# Patient Record
Sex: MALE | Race: WHITE | Employment: OTHER | URBAN - METROPOLITAN AREA
[De-identification: names, ages, dates, MRNs, and addresses within clinical notes are randomized per-mention and may not be internally consistent; named-entity substitution may affect disease eponyms.]

---

## 2017-10-20 ENCOUNTER — HOSPITAL ENCOUNTER (OUTPATIENT)
Age: 67
Discharge: HOME | End: 2017-10-20
Payer: MEDICARE

## 2017-10-20 LAB
ABSOLUTE EOS #: 0.1 K/UL (ref 0–0.4)
ABSOLUTE IMMATURE GRANULOCYTE: NORMAL K/UL (ref 0–0.3)
ABSOLUTE LYMPH #: 1.3 K/UL (ref 1–4.8)
ABSOLUTE MONO #: 0.5 K/UL (ref 0.1–1.3)
ALBUMIN SERPL-MCNC: 4.4 G/DL (ref 3.5–5.2)
ALBUMIN/GLOBULIN RATIO: NORMAL (ref 1–2.5)
ALP BLD-CCNC: 77 U/L (ref 40–129)
ALT SERPL-CCNC: 21 U/L (ref 5–41)
ANION GAP SERPL CALCULATED.3IONS-SCNC: 11 MMOL/L (ref 9–17)
AST SERPL-CCNC: 18 U/L
BASOPHILS # BLD: 1 %
BASOPHILS ABSOLUTE: 0 K/UL (ref 0–0.2)
BILIRUB SERPL-MCNC: 0.62 MG/DL (ref 0.3–1.2)
BILIRUBIN DIRECT: 0.13 MG/DL
BILIRUBIN, INDIRECT: 0.49 MG/DL (ref 0–1)
BUN BLDV-MCNC: 15 MG/DL (ref 8–23)
CHLORIDE BLD-SCNC: 105 MMOL/L (ref 98–107)
CHOLESTEROL/HDL RATIO: 3.7
CHOLESTEROL: 206 MG/DL
CO2: 25 MMOL/L (ref 20–31)
CREAT SERPL-MCNC: 0.82 MG/DL (ref 0.7–1.2)
DIFFERENTIAL TYPE: NORMAL
EOSINOPHILS RELATIVE PERCENT: 3 %
GFR AFRICAN AMERICAN: >60 ML/MIN
GFR NON-AFRICAN AMERICAN: >60 ML/MIN
GFR SERPL CREATININE-BSD FRML MDRD: NORMAL ML/MIN/{1.73_M2}
GFR SERPL CREATININE-BSD FRML MDRD: NORMAL ML/MIN/{1.73_M2}
GLOBULIN: NORMAL G/DL (ref 1.5–3.8)
GLUCOSE BLD-MCNC: 108 MG/DL (ref 70–99)
HCT VFR BLD CALC: 48.4 % (ref 41–53)
HDLC SERPL-MCNC: 55 MG/DL
HEMOGLOBIN: 16.4 G/DL (ref 13.5–17.5)
IMMATURE GRANULOCYTES: NORMAL %
LDL CHOLESTEROL: 129 MG/DL (ref 0–130)
LYMPHOCYTES # BLD: 29 %
MCH RBC QN AUTO: 28.6 PG (ref 26–34)
MCHC RBC AUTO-ENTMCNC: 34 G/DL (ref 31–37)
MCV RBC AUTO: 84.1 FL (ref 80–100)
MONOCYTES # BLD: 12 %
PDW BLD-RTO: 14.2 % (ref 11.5–14.9)
PLATELET # BLD: 157 K/UL (ref 150–450)
PLATELET ESTIMATE: NORMAL
PMV BLD AUTO: 7.4 FL (ref 6–12)
POTASSIUM SERPL-SCNC: 4.4 MMOL/L (ref 3.7–5.3)
PROSTATE SPECIFIC ANTIGEN: 3.25 UG/L
RBC # BLD: 5.75 M/UL (ref 4.5–5.9)
RBC # BLD: NORMAL 10*6/UL
SEG NEUTROPHILS: 55 %
SEGMENTED NEUTROPHILS ABSOLUTE COUNT: 2.6 K/UL (ref 1.3–9.1)
SODIUM BLD-SCNC: 141 MMOL/L (ref 135–144)
THYROXINE, FREE: 1.08 NG/DL (ref 0.93–1.7)
TOTAL PROTEIN: 7.2 G/DL (ref 6.4–8.3)
TRIGL SERPL-MCNC: 112 MG/DL
TSH SERPL DL<=0.05 MIU/L-ACNC: 1.34 MIU/L (ref 0.3–5)
VITAMIN D 25-HYDROXY: 33 NG/ML (ref 30–100)
VLDLC SERPL CALC-MCNC: ABNORMAL MG/DL (ref 1–30)
WBC # BLD: 4.6 K/UL (ref 3.5–11)
WBC # BLD: NORMAL 10*3/UL

## 2017-10-20 PROCEDURE — 82306 VITAMIN D 25 HYDROXY: CPT

## 2017-10-20 PROCEDURE — 82565 ASSAY OF CREATININE: CPT

## 2017-10-20 PROCEDURE — 84153 ASSAY OF PSA TOTAL: CPT

## 2017-10-20 PROCEDURE — 85025 COMPLETE CBC W/AUTO DIFF WBC: CPT

## 2017-10-20 PROCEDURE — 80061 LIPID PANEL: CPT

## 2017-10-20 PROCEDURE — 80051 ELECTROLYTE PANEL: CPT

## 2017-10-20 PROCEDURE — 84443 ASSAY THYROID STIM HORMONE: CPT

## 2017-10-20 PROCEDURE — 82947 ASSAY GLUCOSE BLOOD QUANT: CPT

## 2017-10-20 PROCEDURE — 84520 ASSAY OF UREA NITROGEN: CPT

## 2017-10-20 PROCEDURE — 80076 HEPATIC FUNCTION PANEL: CPT

## 2017-10-20 PROCEDURE — 84439 ASSAY OF FREE THYROXINE: CPT

## 2017-10-20 PROCEDURE — 36415 COLL VENOUS BLD VENIPUNCTURE: CPT

## 2017-11-06 ENCOUNTER — OFFICE VISIT (OUTPATIENT)
Dept: ORTHOPEDIC SURGERY | Age: 67
End: 2017-11-06
Payer: MEDICARE

## 2017-11-06 DIAGNOSIS — M25.562 CHRONIC PAIN OF LEFT KNEE: Primary | ICD-10-CM

## 2017-11-06 DIAGNOSIS — G89.29 CHRONIC PAIN OF LEFT KNEE: Primary | ICD-10-CM

## 2017-11-06 PROCEDURE — 1123F ACP DISCUSS/DSCN MKR DOCD: CPT | Performed by: ORTHOPAEDIC SURGERY

## 2017-11-06 PROCEDURE — G8484 FLU IMMUNIZE NO ADMIN: HCPCS | Performed by: ORTHOPAEDIC SURGERY

## 2017-11-06 PROCEDURE — 3017F COLORECTAL CA SCREEN DOC REV: CPT | Performed by: ORTHOPAEDIC SURGERY

## 2017-11-06 PROCEDURE — 4040F PNEUMOC VAC/ADMIN/RCVD: CPT | Performed by: ORTHOPAEDIC SURGERY

## 2017-11-06 PROCEDURE — 4004F PT TOBACCO SCREEN RCVD TLK: CPT | Performed by: ORTHOPAEDIC SURGERY

## 2017-11-06 PROCEDURE — 99203 OFFICE O/P NEW LOW 30 MIN: CPT | Performed by: ORTHOPAEDIC SURGERY

## 2017-11-06 PROCEDURE — G8421 BMI NOT CALCULATED: HCPCS | Performed by: ORTHOPAEDIC SURGERY

## 2017-11-06 PROCEDURE — G8428 CUR MEDS NOT DOCUMENT: HCPCS | Performed by: ORTHOPAEDIC SURGERY

## 2017-11-06 ASSESSMENT — PROMIS GLOBAL HEALTH SCALE
SUM OF RESPONSES TO QUESTIONS 3, 6, 7, & 8: 14
WHO IS THE PERSON COMPLETING THE PROMIS V1.1 SURVEY?: 0
SUM OF RESPONSES TO QUESTIONS 2, 4, 5, & 10: 12
IN THE PAST 7 DAYS, HOW WOULD YOU RATE YOUR PAIN ON AVERAGE [ON A SCALE FROM 0 (NO PAIN) TO 10 (WORST IMAGINABLE PAIN)]?: 6
IN GENERAL, HOW WOULD YOU RATE YOUR SATISFACTION WITH YOUR SOCIAL ACTIVITIES AND RELATIONSHIPS [ON A SCALE OF 1 (POOR) TO 5 (EXCELLENT)]?: 3
HOW IS THE PROMIS V1.1 BEING ADMINISTERED?: 0
IN GENERAL, PLEASE RATE HOW WELL YOU CARRY OUT YOUR USUAL SOCIAL ACTIVITIES (INCLUDES ACTIVITIES AT HOME, AT WORK, AND IN YOUR COMMUNITY, AND RESPONSIBILITIES AS A PARENT, CHILD, SPOUSE, EMPLOYEE, FRIEND, ETC) [ON A SCALE OF 1 (POOR) TO 5 (EXCELLENT)]?: 3
IN THE PAST 7 DAYS, HOW OFTEN HAVE YOU BEEN BOTHERED BY EMOTIONAL PROBLEMS, SUCH AS FEELING ANXIOUS, DEPRESSED, OR IRRITABLE [ON A SCALE FROM 1 (NEVER) TO 5 (ALWAYS)]?: 3
IN GENERAL, HOW WOULD YOU RATE YOUR MENTAL HEALTH, INCLUDING YOUR MOOD AND YOUR ABILITY TO THINK [ON A SCALE OF 1 (POOR) TO 5 (EXCELLENT)]?: 3
IN GENERAL, WOULD YOU SAY YOUR QUALITY OF LIFE IS...[ON A SCALE OF 1 (POOR) TO 5 (EXCELLENT)]: 3
TO WHAT EXTENT ARE YOU ABLE TO CARRY OUT YOUR EVERYDAY PHYSICAL ACTIVITIES SUCH AS WALKING, CLIMBING STAIRS, CARRYING GROCERIES, OR MOVING A CHAIR [ON A SCALE OF 1 (NOT AT ALL) TO 5 (COMPLETELY)]?: 3
IN THE PAST 7 DAYS, HOW WOULD YOU RATE YOUR FATIGUE ON AVERAGE [ON A SCALE FROM 1 (NONE) TO 5 (VERY SEVERE)]?: 3
IN GENERAL, WOULD YOU SAY YOUR HEALTH IS...[ON A SCALE OF 1 (POOR) TO 5 (EXCELLENT)]: 3
IN GENERAL, HOW WOULD YOU RATE YOUR PHYSICAL HEALTH [ON A SCALE OF 1 (POOR) TO 5 (EXCELLENT)]?: 2

## 2017-11-06 ASSESSMENT — KOOS JR
STANDING UPRIGHT: 2
TWISING OR PIVOTING ON KNEE: 2
GOING UP OR DOWN STAIRS: 2
HOW SEVERE IS YOUR KNEE STIFFNESS AFTER FIRST WAKING IN MORNING: 2
BENDING TO THE FLOOR TO PICK UP OBJECT: 1
STRAIGHTENING KNEE FULLY: 2
RISING FROM SITTING: 1

## 2017-11-06 NOTE — PROGRESS NOTES
Stephanie Win M.D.            [address] [place] [address]., [address] [facility], [address] [facility]             Dept Phone: [phone]             Dept Fax:  [phone]  Melecio Chaidez is a 68-year-old patient seen for evaluation for left knee pain. Patient denies any previous injury or trauma to his knee. He did have lower extremity trauma to his tibia years and years ago he is retired from [employer] instead on concrete floors many many years. States his left knee is gotten the point where he can do his daily activities. Examination of his left knee notes his motion is surprisingly good given how bad his knee is radiographically. His motion is 2-130 degrees. He has good stability lots of crepitus throughout. Very mild varus thrust.'s moderate patellofemoral crepitus as well minimal effusion no pain or rotation of his hips. Physical examination was limited to his knees. He states that he has had some ankle problems in the past.    X-rays taken today reviewed by me show standing views AP view of both knees. He also had outside films AP lateral.  Patient has essentially bone-on-bone disease and of the tricompartmentally left knee. His right knee remarkably is perfectly normal.    Impression  Posttraumatic degenerative joint disease left knee    Plan  Patient wishes to have total knee arthroplasty. I does impression that his his distal tibial surgery have affects his overall alignment and did not get does has full length films. I think that he will benefit from having a total knee arthroplasty. Patient only takes a baby aspirin. No personal or family history for DVT. We did discuss the typical preoperative postoperative benign course as well as risks and benefits. We'll see him back here postoperatively. Review of Systems   Constitutional: Negative. HENT: Negative. Respiratory: Negative. Cardiovascular: Negative.     Musculoskeletal:

## 2017-11-10 ENCOUNTER — HOSPITAL ENCOUNTER (OUTPATIENT)
Dept: PREADMISSION TESTING | Age: 67
Discharge: HOME | End: 2017-11-10
Payer: MEDICARE

## 2017-11-10 VITALS
WEIGHT: 170 LBS | TEMPERATURE: 97.8 F | DIASTOLIC BLOOD PRESSURE: 79 MMHG | SYSTOLIC BLOOD PRESSURE: 135 MMHG | HEART RATE: 57 BPM | OXYGEN SATURATION: 95 % | RESPIRATION RATE: 18 BRPM | HEIGHT: 69 IN | BODY MASS INDEX: 25.18 KG/M2

## 2017-11-10 LAB
ABSOLUTE EOS #: 0.1 K/UL (ref 0–0.4)
ABSOLUTE IMMATURE GRANULOCYTE: ABNORMAL K/UL (ref 0–0.3)
ABSOLUTE LYMPH #: 1.5 K/UL (ref 1–4.8)
ABSOLUTE MONO #: 0.7 K/UL (ref 0.1–1.3)
ANION GAP SERPL CALCULATED.3IONS-SCNC: 10 MMOL/L (ref 9–17)
BASOPHILS # BLD: 1 %
BASOPHILS ABSOLUTE: 0.1 K/UL (ref 0–0.2)
BILIRUBIN URINE: NEGATIVE
BUN BLDV-MCNC: 16 MG/DL (ref 8–23)
BUN/CREAT BLD: ABNORMAL (ref 9–20)
CALCIUM SERPL-MCNC: 9.9 MG/DL (ref 8.6–10.4)
CHLORIDE BLD-SCNC: 104 MMOL/L (ref 98–107)
CO2: 28 MMOL/L (ref 20–31)
COLOR: YELLOW
COMMENT UA: NORMAL
CREAT SERPL-MCNC: 0.84 MG/DL (ref 0.7–1.2)
DIFFERENTIAL TYPE: ABNORMAL
EKG ATRIAL RATE: 52 BPM
EKG P AXIS: 53 DEGREES
EKG P-R INTERVAL: 212 MS
EKG Q-T INTERVAL: 408 MS
EKG QRS DURATION: 86 MS
EKG QTC CALCULATION (BAZETT): 379 MS
EKG R AXIS: 23 DEGREES
EKG T AXIS: 60 DEGREES
EKG VENTRICULAR RATE: 52 BPM
EOSINOPHILS RELATIVE PERCENT: 2 %
GFR AFRICAN AMERICAN: >60 ML/MIN
GFR NON-AFRICAN AMERICAN: >60 ML/MIN
GFR SERPL CREATININE-BSD FRML MDRD: ABNORMAL ML/MIN/{1.73_M2}
GFR SERPL CREATININE-BSD FRML MDRD: ABNORMAL ML/MIN/{1.73_M2}
GLUCOSE BLD-MCNC: 113 MG/DL (ref 70–99)
GLUCOSE URINE: NEGATIVE
HCT VFR BLD CALC: 50.4 % (ref 41–53)
HEMOGLOBIN: 16.8 G/DL (ref 13.5–17.5)
IMMATURE GRANULOCYTES: ABNORMAL %
KETONES, URINE: NEGATIVE
LEUKOCYTE ESTERASE, URINE: NEGATIVE
LYMPHOCYTES # BLD: 29 %
MCH RBC QN AUTO: 28.3 PG (ref 26–34)
MCHC RBC AUTO-ENTMCNC: 33.4 G/DL (ref 31–37)
MCV RBC AUTO: 84.8 FL (ref 80–100)
MONOCYTES # BLD: 13 %
NITRITE, URINE: NEGATIVE
PDW BLD-RTO: 13.8 % (ref 11.5–14.9)
PH UA: 6.5 (ref 5–8)
PLATELET # BLD: 177 K/UL (ref 150–450)
PLATELET ESTIMATE: ABNORMAL
PMV BLD AUTO: 8.1 FL (ref 6–12)
POTASSIUM SERPL-SCNC: 4.7 MMOL/L (ref 3.7–5.3)
PROTEIN UA: NEGATIVE
RBC # BLD: 5.95 M/UL (ref 4.5–5.9)
RBC # BLD: ABNORMAL 10*6/UL
SEG NEUTROPHILS: 55 %
SEGMENTED NEUTROPHILS ABSOLUTE COUNT: 2.9 K/UL (ref 1.3–9.1)
SODIUM BLD-SCNC: 142 MMOL/L (ref 135–144)
SPECIFIC GRAVITY UA: 1.01 (ref 1–1.03)
TURBIDITY: CLEAR
URINE HGB: NEGATIVE
UROBILINOGEN, URINE: NORMAL
WBC # BLD: 5.3 K/UL (ref 3.5–11)
WBC # BLD: ABNORMAL 10*3/UL

## 2017-11-10 PROCEDURE — 93005 ELECTROCARDIOGRAM TRACING: CPT

## 2017-11-10 PROCEDURE — 36415 COLL VENOUS BLD VENIPUNCTURE: CPT

## 2017-11-10 PROCEDURE — 80048 BASIC METABOLIC PNL TOTAL CA: CPT

## 2017-11-10 PROCEDURE — 81003 URINALYSIS AUTO W/O SCOPE: CPT

## 2017-11-10 PROCEDURE — 87641 MR-STAPH DNA AMP PROBE: CPT

## 2017-11-10 PROCEDURE — 85025 COMPLETE CBC W/AUTO DIFF WBC: CPT

## 2017-11-10 NOTE — H&P
HISTORY and [address]       NAME:  Jerrell Bledsoe  MRN: [id number]   YOB: 1950   Date: 11/10/2017   Age: 75 y.o. Gender: male       COMPLAINT AND PRESENT HISTORY:     Jerrell Bledsoe is 75 y.o.,   male, undergoing preadmission testing for left Knee O. A. Patient C/O of pain swelling, stiffness, popping and cracking in the left Knee. Pt describes the pain as 7/10 in intensity at times. Symptoms started 1 month ago. The knee does occ buckle or give way under the patient. No locking up, No recent falls or trauma. No redness, swelling or rashes. No Hx of MRSA infections in the past.    PAST MEDICAL HISTORY     Past Medical History:   Diagnosis Date    Acid indigestion     a little    BPH (benign prostatic hyperplasia)     COPD (chronic obstructive pulmonary disease) (HCC)     Depression     Full dentures     Hyperlipidemia     Hypertension     Wears glasses        Pt denies any history of Diabetes mellitus type 2, stroke, heart disease, COPD, Asthma, Cancer, Seizures,Thyroid disease, Hepatitis, TB or Substance abuse. SURGICAL HISTORY       Past Surgical History:   Procedure Laterality Date    ABDOMINAL EXPLORATION SURGERY Right 1968    'kidney nicked by gilmer\" 2 PIECES REMAIN TO RT. KIDNEY.     CATARACT REMOVAL Bilateral     iol's    CHOLECYSTECTOMY, LAPAROSCOPIC  9/8/15    COLONOSCOPY      FOOT SURGERY Left     FRACTURE SURGERY      left leg 1970, no hardware    TONSILLECTOMY         FAMILY HISTORY       Family History   Problem Relation Age of Onset    Cancer Mother      throat    COPD Father     Other Father      mesothelioma       SOCIAL HISTORY       Social History     Social History    Marital status:      Spouse name: N/A    Number of children: N/A    Years of education: N/A     Social History Main Topics    Smoking status: Former Smoker    Smokeless tobacco: Never Used      Comment: QUIT SMOKING 2009    Alcohol use No

## 2017-11-11 ENCOUNTER — ANESTHESIA EVENT (OUTPATIENT)
Dept: OPERATING ROOM | Age: 67
DRG: 470 | End: 2017-11-11
Payer: MEDICARE

## 2017-11-11 LAB
MRSA, DNA, NASAL: NORMAL
SPECIMEN DESCRIPTION: NORMAL

## 2017-11-11 RX ORDER — SODIUM CHLORIDE 0.9 % (FLUSH) 0.9 %
10 SYRINGE (ML) INJECTION PRN
Status: CANCELLED | OUTPATIENT
Start: 2017-11-11

## 2017-11-11 RX ORDER — SODIUM CHLORIDE, SODIUM LACTATE, POTASSIUM CHLORIDE, CALCIUM CHLORIDE 600; 310; 30; 20 MG/100ML; MG/100ML; MG/100ML; MG/100ML
INJECTION, SOLUTION INTRAVENOUS CONTINUOUS
Status: CANCELLED | OUTPATIENT
Start: 2017-11-11

## 2017-11-11 RX ORDER — LIDOCAINE HYDROCHLORIDE 10 MG/ML
1 INJECTION, SOLUTION EPIDURAL; INFILTRATION; INTRACAUDAL; PERINEURAL
Status: CANCELLED | OUTPATIENT
Start: 2017-11-11 | End: 2017-11-11

## 2017-11-11 RX ORDER — SODIUM CHLORIDE 0.9 % (FLUSH) 0.9 %
10 SYRINGE (ML) INJECTION EVERY 12 HOURS SCHEDULED
Status: CANCELLED | OUTPATIENT
Start: 2017-11-11

## 2017-11-13 LAB
DIRECT EXAM: NORMAL
Lab: NORMAL
SPECIMEN DESCRIPTION: NORMAL
STATUS: NORMAL

## 2017-11-21 ENCOUNTER — ANESTHESIA (OUTPATIENT)
Dept: OPERATING ROOM | Age: 67
DRG: 470 | End: 2017-11-21
Payer: MEDICARE

## 2017-11-21 ENCOUNTER — APPOINTMENT (OUTPATIENT)
Dept: GENERAL RADIOLOGY | Age: 67
DRG: 470 | End: 2017-11-21
Attending: ORTHOPAEDIC SURGERY
Payer: MEDICARE

## 2017-11-21 ENCOUNTER — HOSPITAL ENCOUNTER (INPATIENT)
Age: 67
LOS: 1 days | Discharge: HOME HEALTH | DRG: 470 | End: 2017-11-22
Attending: ORTHOPAEDIC SURGERY | Admitting: ORTHOPAEDIC SURGERY
Payer: MEDICARE

## 2017-11-21 VITALS — DIASTOLIC BLOOD PRESSURE: 55 MMHG | TEMPERATURE: 94.8 F | SYSTOLIC BLOOD PRESSURE: 104 MMHG | OXYGEN SATURATION: 92 %

## 2017-11-21 PROBLEM — M17.12 PRIMARY OSTEOARTHRITIS OF LEFT KNEE: Status: ACTIVE | Noted: 2017-11-21

## 2017-11-21 PROCEDURE — 64448 NJX AA&/STRD FEM NRV NFS IMG: CPT | Performed by: ANESTHESIOLOGY

## 2017-11-21 PROCEDURE — 3700000001 HC ADD 15 MINUTES (ANESTHESIA): Performed by: ORTHOPAEDIC SURGERY

## 2017-11-21 PROCEDURE — 0SRD0J9 REPLACEMENT OF LEFT KNEE JOINT WITH SYNTHETIC SUBSTITUTE, CEMENTED, OPEN APPROACH: ICD-10-PCS | Performed by: ORTHOPAEDIC SURGERY

## 2017-11-21 PROCEDURE — 6360000002 HC RX W HCPCS: Performed by: ORTHOPAEDIC SURGERY

## 2017-11-21 PROCEDURE — 3600000015 HC SURGERY LEVEL 5 ADDTL 15MIN: Performed by: ORTHOPAEDIC SURGERY

## 2017-11-21 PROCEDURE — 6370000000 HC RX 637 (ALT 250 FOR IP): Performed by: FAMILY MEDICINE

## 2017-11-21 PROCEDURE — C1713 ANCHOR/SCREW BN/BN,TIS/BN: HCPCS | Performed by: ORTHOPAEDIC SURGERY

## 2017-11-21 PROCEDURE — A6197 ALGINATE DRSG >16 <=48 SQ IN: HCPCS | Performed by: ORTHOPAEDIC SURGERY

## 2017-11-21 PROCEDURE — 73560 X-RAY EXAM OF KNEE 1 OR 2: CPT

## 2017-11-21 PROCEDURE — 2720000010 HC SURG SUPPLY STERILE: Performed by: ORTHOPAEDIC SURGERY

## 2017-11-21 PROCEDURE — 2580000003 HC RX 258: Performed by: ANESTHESIOLOGY

## 2017-11-21 PROCEDURE — 2580000003 HC RX 258: Performed by: ORTHOPAEDIC SURGERY

## 2017-11-21 PROCEDURE — C1776 JOINT DEVICE (IMPLANTABLE): HCPCS | Performed by: ORTHOPAEDIC SURGERY

## 2017-11-21 PROCEDURE — 7100000000 HC PACU RECOVERY - FIRST 15 MIN: Performed by: ORTHOPAEDIC SURGERY

## 2017-11-21 PROCEDURE — 2500000003 HC RX 250 WO HCPCS: Performed by: NURSE ANESTHETIST, CERTIFIED REGISTERED

## 2017-11-21 PROCEDURE — 3700000000 HC ANESTHESIA ATTENDED CARE: Performed by: ORTHOPAEDIC SURGERY

## 2017-11-21 PROCEDURE — 6360000002 HC RX W HCPCS: Performed by: NURSE ANESTHETIST, CERTIFIED REGISTERED

## 2017-11-21 PROCEDURE — 6360000002 HC RX W HCPCS: Performed by: ANESTHESIOLOGY

## 2017-11-21 PROCEDURE — 2500000003 HC RX 250 WO HCPCS: Performed by: ORTHOPAEDIC SURGERY

## 2017-11-21 PROCEDURE — 2500000003 HC RX 250 WO HCPCS: Performed by: ANESTHESIOLOGY

## 2017-11-21 PROCEDURE — 1200000000 HC SEMI PRIVATE

## 2017-11-21 PROCEDURE — 3600000005 HC SURGERY LEVEL 5 BASE: Performed by: ORTHOPAEDIC SURGERY

## 2017-11-21 PROCEDURE — 3E0T3BZ INTRODUCTION OF ANESTHETIC AGENT INTO PERIPHERAL NERVES AND PLEXI, PERCUTANEOUS APPROACH: ICD-10-PCS | Performed by: ANESTHESIOLOGY

## 2017-11-21 PROCEDURE — 2780000010 HC IMPLANT OTHER: Performed by: ORTHOPAEDIC SURGERY

## 2017-11-21 PROCEDURE — 7100000001 HC PACU RECOVERY - ADDTL 15 MIN: Performed by: ORTHOPAEDIC SURGERY

## 2017-11-21 PROCEDURE — 6370000000 HC RX 637 (ALT 250 FOR IP): Performed by: ORTHOPAEDIC SURGERY

## 2017-11-21 DEVICE — DISCONTINUED USE 416978 CEMENT PALACOS R SING DOSE 40GR: Type: IMPLANTABLE DEVICE | Site: KNEE | Status: FUNCTIONAL

## 2017-11-21 DEVICE — COMPONENT FEM 67.5MM L KNEE CO CHROM NP INTLOK PRI CRUCE: Type: IMPLANTABLE DEVICE | Site: KNEE | Status: FUNCTIONAL

## 2017-11-21 DEVICE — TRAY TIB L79MM KNEE CO CHROM FIN MOD INTLOK VANGUARD: Type: IMPLANTABLE DEVICE | Site: KNEE | Status: FUNCTIONAL

## 2017-11-21 DEVICE — COMPONENT PAT DIA37MM THK8.6MM THN KNEE POLY 3 PEG SER A: Type: IMPLANTABLE DEVICE | Site: KNEE | Status: FUNCTIONAL

## 2017-11-21 DEVICE — IMPLANTABLE DEVICE: Type: IMPLANTABLE DEVICE | Site: KNEE | Status: FUNCTIONAL

## 2017-11-21 RX ORDER — MORPHINE SULFATE 10 MG/ML
2 INJECTION, SOLUTION INTRAMUSCULAR; INTRAVENOUS EVERY 5 MIN PRN
Status: DISCONTINUED | OUTPATIENT
Start: 2017-11-21 | End: 2017-11-21 | Stop reason: HOSPADM

## 2017-11-21 RX ORDER — ATORVASTATIN CALCIUM 20 MG/1
20 TABLET, FILM COATED ORAL NIGHTLY
Status: DISCONTINUED | OUTPATIENT
Start: 2017-11-21 | End: 2017-11-22 | Stop reason: HOSPADM

## 2017-11-21 RX ORDER — DIPHENHYDRAMINE HYDROCHLORIDE 50 MG/ML
12.5 INJECTION INTRAMUSCULAR; INTRAVENOUS
Status: DISCONTINUED | OUTPATIENT
Start: 2017-11-21 | End: 2017-11-21 | Stop reason: HOSPADM

## 2017-11-21 RX ORDER — SODIUM CHLORIDE, SODIUM LACTATE, POTASSIUM CHLORIDE, CALCIUM CHLORIDE 600; 310; 30; 20 MG/100ML; MG/100ML; MG/100ML; MG/100ML
INJECTION, SOLUTION INTRAVENOUS CONTINUOUS
Status: DISCONTINUED | OUTPATIENT
Start: 2017-11-21 | End: 2017-11-22 | Stop reason: HOSPADM

## 2017-11-21 RX ORDER — MEPERIDINE HYDROCHLORIDE 50 MG/ML
12.5 INJECTION INTRAMUSCULAR; INTRAVENOUS; SUBCUTANEOUS EVERY 5 MIN PRN
Status: DISCONTINUED | OUTPATIENT
Start: 2017-11-21 | End: 2017-11-21 | Stop reason: HOSPADM

## 2017-11-21 RX ORDER — LABETALOL HYDROCHLORIDE 5 MG/ML
5 INJECTION, SOLUTION INTRAVENOUS EVERY 10 MIN PRN
Status: DISCONTINUED | OUTPATIENT
Start: 2017-11-21 | End: 2017-11-21 | Stop reason: HOSPADM

## 2017-11-21 RX ORDER — OXYCODONE HYDROCHLORIDE 5 MG/1
10 TABLET ORAL EVERY 4 HOURS PRN
Status: DISCONTINUED | OUTPATIENT
Start: 2017-11-21 | End: 2017-11-22 | Stop reason: HOSPADM

## 2017-11-21 RX ORDER — PROPOFOL 10 MG/ML
INJECTION, EMULSION INTRAVENOUS CONTINUOUS PRN
Status: DISCONTINUED | OUTPATIENT
Start: 2017-11-21 | End: 2017-11-21 | Stop reason: SDUPTHER

## 2017-11-21 RX ORDER — LIDOCAINE HYDROCHLORIDE 10 MG/ML
1 INJECTION, SOLUTION EPIDURAL; INFILTRATION; INTRACAUDAL; PERINEURAL
Status: DISCONTINUED | OUTPATIENT
Start: 2017-11-21 | End: 2017-11-21 | Stop reason: HOSPADM

## 2017-11-21 RX ORDER — SODIUM CHLORIDE 0.9 % (FLUSH) 0.9 %
10 SYRINGE (ML) INJECTION PRN
Status: DISCONTINUED | OUTPATIENT
Start: 2017-11-21 | End: 2017-11-22 | Stop reason: HOSPADM

## 2017-11-21 RX ORDER — ONDANSETRON 2 MG/ML
4 INJECTION INTRAMUSCULAR; INTRAVENOUS
Status: DISCONTINUED | OUTPATIENT
Start: 2017-11-21 | End: 2017-11-21 | Stop reason: HOSPADM

## 2017-11-21 RX ORDER — HYDROMORPHONE HCL 110MG/55ML
0.5 PATIENT CONTROLLED ANALGESIA SYRINGE INTRAVENOUS EVERY 5 MIN PRN
Status: DISCONTINUED | OUTPATIENT
Start: 2017-11-21 | End: 2017-11-21 | Stop reason: HOSPADM

## 2017-11-21 RX ORDER — TRANEXAMIC ACID 100 MG/ML
INJECTION, SOLUTION INTRAVENOUS PRN
Status: DISCONTINUED | OUTPATIENT
Start: 2017-11-21 | End: 2017-11-21 | Stop reason: SDUPTHER

## 2017-11-21 RX ORDER — SODIUM CHLORIDE 0.9 % (FLUSH) 0.9 %
10 SYRINGE (ML) INJECTION PRN
Status: DISCONTINUED | OUTPATIENT
Start: 2017-11-21 | End: 2017-11-21 | Stop reason: HOSPADM

## 2017-11-21 RX ORDER — BUPROPION HYDROCHLORIDE 300 MG/1
300 TABLET ORAL EVERY MORNING
Status: DISCONTINUED | OUTPATIENT
Start: 2017-11-22 | End: 2017-11-22 | Stop reason: HOSPADM

## 2017-11-21 RX ORDER — MIDAZOLAM HYDROCHLORIDE 1 MG/ML
INJECTION INTRAMUSCULAR; INTRAVENOUS PRN
Status: DISCONTINUED | OUTPATIENT
Start: 2017-11-21 | End: 2017-11-21 | Stop reason: SDUPTHER

## 2017-11-21 RX ORDER — KETOROLAC TROMETHAMINE 30 MG/ML
15 INJECTION, SOLUTION INTRAMUSCULAR; INTRAVENOUS EVERY 8 HOURS SCHEDULED
Status: DISCONTINUED | OUTPATIENT
Start: 2017-11-21 | End: 2017-11-22 | Stop reason: HOSPADM

## 2017-11-21 RX ORDER — OXYCODONE HYDROCHLORIDE 5 MG/1
5 TABLET ORAL EVERY 4 HOURS PRN
Status: DISCONTINUED | OUTPATIENT
Start: 2017-11-21 | End: 2017-11-22 | Stop reason: HOSPADM

## 2017-11-21 RX ORDER — SODIUM CHLORIDE 0.9 % (FLUSH) 0.9 %
10 SYRINGE (ML) INJECTION EVERY 12 HOURS SCHEDULED
Status: DISCONTINUED | OUTPATIENT
Start: 2017-11-21 | End: 2017-11-21 | Stop reason: HOSPADM

## 2017-11-21 RX ORDER — SODIUM CHLORIDE, SODIUM LACTATE, POTASSIUM CHLORIDE, CALCIUM CHLORIDE 600; 310; 30; 20 MG/100ML; MG/100ML; MG/100ML; MG/100ML
INJECTION, SOLUTION INTRAVENOUS CONTINUOUS
Status: DISCONTINUED | OUTPATIENT
Start: 2017-11-21 | End: 2017-11-22

## 2017-11-21 RX ORDER — PANTOPRAZOLE SODIUM 40 MG/1
40 TABLET, DELAYED RELEASE ORAL
Status: DISCONTINUED | OUTPATIENT
Start: 2017-11-22 | End: 2017-11-22 | Stop reason: HOSPADM

## 2017-11-21 RX ORDER — BACITRACIN 50000 [USP'U]/1
INJECTION, POWDER, LYOPHILIZED, FOR SOLUTION INTRAMUSCULAR PRN
Status: DISCONTINUED | OUTPATIENT
Start: 2017-11-21 | End: 2017-11-21 | Stop reason: HOSPADM

## 2017-11-21 RX ORDER — FENTANYL CITRATE 50 UG/ML
INJECTION, SOLUTION INTRAMUSCULAR; INTRAVENOUS PRN
Status: DISCONTINUED | OUTPATIENT
Start: 2017-11-21 | End: 2017-11-21 | Stop reason: SDUPTHER

## 2017-11-21 RX ORDER — LIDOCAINE HYDROCHLORIDE 10 MG/ML
INJECTION, SOLUTION EPIDURAL; INFILTRATION; INTRACAUDAL; PERINEURAL PRN
Status: DISCONTINUED | OUTPATIENT
Start: 2017-11-21 | End: 2017-11-21 | Stop reason: SDUPTHER

## 2017-11-21 RX ORDER — ONDANSETRON 2 MG/ML
4 INJECTION INTRAMUSCULAR; INTRAVENOUS EVERY 6 HOURS PRN
Status: DISCONTINUED | OUTPATIENT
Start: 2017-11-21 | End: 2017-11-22 | Stop reason: HOSPADM

## 2017-11-21 RX ORDER — ACETAMINOPHEN 500 MG
1000 TABLET ORAL EVERY 8 HOURS SCHEDULED
Status: DISCONTINUED | OUTPATIENT
Start: 2017-11-21 | End: 2017-11-22 | Stop reason: HOSPADM

## 2017-11-21 RX ORDER — DOCUSATE SODIUM 100 MG/1
100 CAPSULE, LIQUID FILLED ORAL 2 TIMES DAILY
Status: DISCONTINUED | OUTPATIENT
Start: 2017-11-21 | End: 2017-11-22 | Stop reason: HOSPADM

## 2017-11-21 RX ORDER — SODIUM CHLORIDE 0.9 % (FLUSH) 0.9 %
10 SYRINGE (ML) INJECTION EVERY 12 HOURS SCHEDULED
Status: DISCONTINUED | OUTPATIENT
Start: 2017-11-21 | End: 2017-11-22 | Stop reason: HOSPADM

## 2017-11-21 RX ORDER — TAMSULOSIN HYDROCHLORIDE 0.4 MG/1
0.4 CAPSULE ORAL NIGHTLY
Status: DISCONTINUED | OUTPATIENT
Start: 2017-11-21 | End: 2017-11-22 | Stop reason: HOSPADM

## 2017-11-21 RX ADMIN — ACETAMINOPHEN 1000 MG: 500 TABLET ORAL at 17:09

## 2017-11-21 RX ADMIN — MEPERIDINE HYDROCHLORIDE 12.5 MG: 50 INJECTION, SOLUTION INTRAMUSCULAR; INTRAVENOUS; SUBCUTANEOUS at 15:20

## 2017-11-21 RX ADMIN — CEFAZOLIN 2 G: 1 INJECTION, POWDER, FOR SOLUTION INTRAMUSCULAR; INTRAVENOUS at 13:07

## 2017-11-21 RX ADMIN — DOCUSATE SODIUM 100 MG: 100 CAPSULE, LIQUID FILLED ORAL at 21:12

## 2017-11-21 RX ADMIN — SODIUM CHLORIDE, POTASSIUM CHLORIDE, SODIUM LACTATE AND CALCIUM CHLORIDE: 600; 310; 30; 20 INJECTION, SOLUTION INTRAVENOUS at 13:30

## 2017-11-21 RX ADMIN — SODIUM CHLORIDE, POTASSIUM CHLORIDE, SODIUM LACTATE AND CALCIUM CHLORIDE: 600; 310; 30; 20 INJECTION, SOLUTION INTRAVENOUS at 17:05

## 2017-11-21 RX ADMIN — TRANEXAMIC ACID 1000 MG: 100 INJECTION, SOLUTION INTRAVENOUS at 14:18

## 2017-11-21 RX ADMIN — KETOROLAC TROMETHAMINE 15 MG: 30 INJECTION, SOLUTION INTRAMUSCULAR at 21:12

## 2017-11-21 RX ADMIN — PROPOFOL 100 MCG/KG/MIN: 10 INJECTION, EMULSION INTRAVENOUS at 13:18

## 2017-11-21 RX ADMIN — FENTANYL CITRATE 50 MCG: 50 INJECTION, SOLUTION INTRAMUSCULAR; INTRAVENOUS at 13:11

## 2017-11-21 RX ADMIN — OXYCODONE HYDROCHLORIDE 5 MG: 5 TABLET ORAL at 22:52

## 2017-11-21 RX ADMIN — Medication 750 ML: at 14:59

## 2017-11-21 RX ADMIN — WATER 2 G: 1 INJECTION INTRAMUSCULAR; INTRAVENOUS; SUBCUTANEOUS at 22:34

## 2017-11-21 RX ADMIN — SODIUM CHLORIDE, POTASSIUM CHLORIDE, SODIUM LACTATE AND CALCIUM CHLORIDE: 600; 310; 30; 20 INJECTION, SOLUTION INTRAVENOUS at 22:52

## 2017-11-21 RX ADMIN — SODIUM CHLORIDE, POTASSIUM CHLORIDE, SODIUM LACTATE AND CALCIUM CHLORIDE: 600; 310; 30; 20 INJECTION, SOLUTION INTRAVENOUS at 13:07

## 2017-11-21 RX ADMIN — ASPIRIN 325 MG: 325 TABLET, DELAYED RELEASE ORAL at 21:12

## 2017-11-21 RX ADMIN — MIDAZOLAM 2 MG: 1 INJECTION INTRAMUSCULAR; INTRAVENOUS at 13:07

## 2017-11-21 RX ADMIN — LIDOCAINE HYDROCHLORIDE 50 MG: 10 INJECTION, SOLUTION EPIDURAL; INFILTRATION; INTRACAUDAL; PERINEURAL at 13:18

## 2017-11-21 RX ADMIN — TRANEXAMIC ACID 1000 MG: 100 INJECTION, SOLUTION INTRAVENOUS at 13:22

## 2017-11-21 RX ADMIN — FENTANYL CITRATE 25 MCG: 50 INJECTION, SOLUTION INTRAMUSCULAR; INTRAVENOUS at 13:50

## 2017-11-21 RX ADMIN — FENTANYL CITRATE 25 MCG: 50 INJECTION, SOLUTION INTRAMUSCULAR; INTRAVENOUS at 14:28

## 2017-11-21 RX ADMIN — ATORVASTATIN CALCIUM 20 MG: 20 TABLET, FILM COATED ORAL at 21:12

## 2017-11-21 RX ADMIN — KETOROLAC TROMETHAMINE 15 MG: 30 INJECTION, SOLUTION INTRAMUSCULAR at 17:10

## 2017-11-21 RX ADMIN — ACETAMINOPHEN 1000 MG: 500 TABLET ORAL at 21:12

## 2017-11-21 RX ADMIN — SODIUM CHLORIDE, POTASSIUM CHLORIDE, SODIUM LACTATE AND CALCIUM CHLORIDE: 600; 310; 30; 20 INJECTION, SOLUTION INTRAVENOUS at 11:49

## 2017-11-21 RX ADMIN — TAMSULOSIN HYDROCHLORIDE 0.4 MG: 0.4 CAPSULE ORAL at 21:12

## 2017-11-21 ASSESSMENT — ENCOUNTER SYMPTOMS
STRIDOR: 0
SHORTNESS OF BREATH: 0

## 2017-11-21 ASSESSMENT — PAIN SCALES - GENERAL
PAINLEVEL_OUTOF10: 5
PAINLEVEL_OUTOF10: 0
PAINLEVEL_OUTOF10: 6
PAINLEVEL_OUTOF10: 0
PAINLEVEL_OUTOF10: 6

## 2017-11-21 ASSESSMENT — PAIN DESCRIPTION - LOCATION
LOCATION: KNEE
LOCATION: KNEE

## 2017-11-21 ASSESSMENT — PAIN DESCRIPTION - ORIENTATION
ORIENTATION: LEFT
ORIENTATION: LEFT

## 2017-11-21 ASSESSMENT — PAIN DESCRIPTION - PAIN TYPE
TYPE: SURGICAL PAIN
TYPE: SURGICAL PAIN

## 2017-11-21 ASSESSMENT — COPD QUESTIONNAIRES: CAT_SEVERITY: NO INTERVAL CHANGE

## 2017-11-21 ASSESSMENT — PAIN - FUNCTIONAL ASSESSMENT: PAIN_FUNCTIONAL_ASSESSMENT: 0-10

## 2017-11-21 ASSESSMENT — LIFESTYLE VARIABLES: SMOKING_STATUS: 0

## 2017-11-21 NOTE — H&P
stroke, heart disease, COPD, Asthma, Cancer, Seizures,Thyroid disease, Hepatitis, TB or Substance abuse.     SURGICAL HISTORY        Past Surgical History         Past Surgical History:   Procedure Laterality Date    ABDOMINAL EXPLORATION SURGERY Right 1968     'kidney nicked by schrapnel\" 2 PIECES REMAIN TO RT. KIDNEY.  CATARACT REMOVAL Bilateral       iol's    CHOLECYSTECTOMY, LAPAROSCOPIC   9/8/15    COLONOSCOPY        FOOT SURGERY Left      FRACTURE SURGERY         left leg 1970, no hardware    TONSILLECTOMY                FAMILY HISTORY        Family History          Family History   Problem Relation Age of Onset    Cancer Mother         throat    COPD Father      Other Father         mesothelioma            SOCIAL HISTORY        Social History   Social History            Social History    Marital status:        Spouse name: N/A    Number of children: N/A    Years of education: N/A             Social History Main Topics    Smoking status: Former Smoker    Smokeless tobacco: Never Used         Comment: QUIT SMOKING 2009    Alcohol use No         Comment: quit 1982    Drug use: No    Sexual activity: Not Asked           Other Topics Concern    None          Social History Narrative    None               REVIEW OF SYSTEMS             Allergies   Allergen Reactions    Aleve [Naproxen Sodium] Hives       If takes to many \"I'll break out in hives. \"                Current Outpatient Prescriptions on File Prior to Encounter   Medication Sig Dispense Refill    tamsulosin (FLOMAX) 0.4 MG capsule Take 1 capsule by mouth daily (Patient taking differently: Take 0.4 mg by mouth nightly ) 30 capsule 3    atorvastatin (LIPITOR) 20 MG tablet Take 20 mg by mouth daily        buPROPion (WELLBUTRIN XL) 300 MG XL tablet Take 300 mg by mouth every morning        aspirin 81 MG tablet Take 81 mg by mouth daily        sildenafil (VIAGRA) 50 MG tablet Take 50 mg by mouth as needed for Erectile Dysfunction        omeprazole (PRILOSEC) 20 MG capsule Take 20 mg by mouth nightly         metoprolol (LOPRESSOR) 25 MG tablet Take 25 mg by mouth daily        mupirocin (BACTROBAN) 2 % ointment Place small amount in each nares (nostril) as directed, twice a day, for 10 days prior to surgery. 1 Tube 0      No current facility-administered medications on file prior to encounter.          General health:  Fairly good. No fever or chills. Skin:  No itching, redness or rash. HEENT:  No headache, epistaxis or sore throat. Neck:  No pain, stiffness or masses. Cardiovascular/Respiratory system:  No chest pain, palpitation or shortness of breath. Gastrointestinal tract: No abdominal pain, nausea, vomiting, diarrhea or constipation. Genitourinary:  No burning on micturition. No hesitancy, urgency, frequency or discoloration of urine. Locomotor: See HPI. Neuropsychiatric:  No referable complaints.                        GENERAL PHYSICAL EXAM:      Vitals: /79   Pulse 57   Temp 97.8 °F (36.6 °C) (Oral)   Resp 18   Ht 5' 9\" (1.753 m)   Wt 170 lb (77.1 kg)   SpO2 95%   BMI 25.10 kg/m²  Body mass index is 25.1 kg/m².         GENERAL APPEARANCE:   Alyson Rodrigez is 75 y.o.,  male, not obese, nourished, conscious, alert. Does not appear to be distress or pain at this time. SKIN:  Warm, dry, no cyanosis or jaundice. HEAD:  Normocephalic, atraumatic, no swelling or tenderness. EYES:  Pupils equal, reactive to light. EARS:  No discharge, no marked hearing loss. NOSE:  No rhinorrhea, epistaxis or septal deformity. THROAT:  Not congested. No ulceration bleeding or discharge.                   NECK:  No stiffness, trachea central.  No palpable masses or L.N.                 CHEST: Symmetrical and equal on expansion. HEART:  RRR S1 > S2. No audible murmurs or gallops. LUNGS:  Equal on expansion, normal breath sounds. No adventitious sounds. ABDOMEN: Soft on palpation. No localized tenderness. No guarding or rigidity. No palpable organomegaly. LYMPHATICS:  No palpable cervical lymphadenopathy.      LOCOMOTOR, BACK AND SPINE:  No tenderness or deformities. EXTREMITIES:  Symmetrical, no pedal edema. Tenderness on palpation of the Lt Knee joint space worse on the medial aspect, with limitation in the ROM. Tainas sign negative. No discoloration or ulcerations.     NEUROLOGIC:  The patient is conscious, alert, oriented, No apparent focal sensory or motor deficits. PROVISIONAL DIAGNOSES / SURGERY:       Lt Knee O. A.   Lt Total Knee Arthroplasty.          Patient Active Problem List     Diagnosis Date Noted    Chronic cholecystitis 09/09/2015            REBECA LAMAS PA-C on 11/10/2017 at 9:47 AM         Cosigned by: Leveda Phalen, MD at 11/10/2017 10:34 AM   Revision History                        Routing History

## 2017-11-21 NOTE — OP NOTE
were cut of 14 mm. The extra medullary tibial cutting guide as a position represents the tubercle and ankle joint May for consult and 90° long axis with slight posterior slope and the cut made and the bone fragment removed. Utilizing these 3° of femoral sizing guide size 67.5 was discussed and found. The drill holes are made during the 67.5 cutting block was positioned in proper X rotation was verified with trans-epicondylar axis. Satisfied with this anterior posterior chamfer cuts are made and bone fragments removed. Utilizing layers better as fast told posterior ossified removed. Posterior capsule was injected as well the tibial cut was then assessed with a baseplate alignment tim found to be good spacer blocks in the lysis is flex essentially absent moderate medial posterior medial release and removal medial tibial ossified facilitated this 79 baseplate was impacted position with the alignment tim down the middle third of the tubercle this was pinned and placed our guide is applied proximal tibia serene fins impacted. The distal trial with a 67.5 femur and first a 16 and an 18 mm anterior stabilized polyethylene was found to give excellent range of motion stability as well as patellar tracking patellar height and good mid flexion stability. The drill holes were made in the distal femur all the trial component removed. Knee was irrigated and dried while cement comparison as a place of both implantable cut surfaces knees were impacted in excellent assessment removed to decompress the 18 palate with the patellas applying clamp. Knee then was injected remaining 100 mL a total joint solution and then a Betadine lavage and carried out knee was then carefully irrigated and inspected thoroughly for any further cement or soft tissue debris which was removed after re-trialing a little bit to go with a permanent 18 mm age are stabilized polyethylene which was placed and locked.   The knee was aspirated plan a rich

## 2017-11-21 NOTE — ANESTHESIA PRE PROCEDURE
Department of Anesthesiology  Preprocedure Note       Name:  Jordan Coto   Age:  75 y.o.  :  1950                                          MRN:  [id number]         Date:  2017      Surgeon: Odette Galan):  Bekah Whitaker MD    Procedure: Procedure(s):  TOTAL KNEE ARTHROPLASTY    Medications prior to admission:   Prior to Admission medications    Medication Sig Start Date End Date Taking? Authorizing Provider   Budesonide-Formoterol Fumarate (SYMBICORT IN) Inhale 2 puffs into the lungs 2 times daily   Yes Historical Provider, MD   mupirocin (BACTROBAN) 2 % ointment Place small amount in each nares (nostril) as directed, twice a day, for 10 days prior to surgery.  17  Yes Bekah Whitaker MD   tamsulosin (FLOMAX) 0.4 MG capsule Take 1 capsule by mouth daily  Patient taking differently: Take 0.4 mg by mouth nightly  9/9/15  Yes Leann Fulton DO   atorvastatin (LIPITOR) 20 MG tablet Take 20 mg by mouth daily   Yes Historical Provider, MD   buPROPion (WELLBUTRIN XL) 300 MG XL tablet Take 300 mg by mouth every morning   Yes Historical Provider, MD   aspirin 81 MG tablet Take 81 mg by mouth daily   Yes Historical Provider, MD   sildenafil (VIAGRA) 50 MG tablet Take 50 mg by mouth as needed for Erectile Dysfunction   Yes Historical Provider, MD   omeprazole (PRILOSEC) 20 MG capsule Take 20 mg by mouth nightly    Yes Historical Provider, MD   metoprolol (LOPRESSOR) 25 MG tablet Take 25 mg by mouth daily   Yes Historical Provider, MD       Current medications:    Current Facility-Administered Medications   Medication Dose Route Frequency Provider Last Rate Last Dose    ceFAZolin (ANCEF) 2 g in sterile water 20 mL IV syringe  2 g Intravenous On Call to  Sunshine Raman MD        lactated ringers infusion   Intravenous Continuous Daisy Rudd  mL/hr at 17 1149      lidocaine PF 1 % injection 1 mL  1 mL Intradermal Once PRN Daisy Rudd MD        sodium chloride flush 0.9 %

## 2017-11-21 NOTE — ANESTHESIA POSTPROCEDURE EVALUATION
POST- ANESTHESIA EVALUATION       Pt Name: Caroll Fabry  MRN: [id number]  YOB: 1950  Date of evaluation: 11/21/2017  Time:  4:43 PM      BP (!) 151/87   Pulse 71   Temp 97.3 °F (36.3 °C) (Oral)   Resp 12   Ht 5' 9\" (1.753 m)   Wt 170 lb (77.1 kg)   SpO2 93%   BMI 25.10 kg/m²      Consciousness Level  Awake  Cardiopulmonary Status  Stable  Pain Adequately Treated YES  Nausea / Vomiting  NO  Adequate Hydration  YES  Anesthesia Related Complications NONE      Electronically signed by Kyrie Boyer MD on 11/21/2017 at 4:43 PM       Department of Anesthesiology  Postprocedure Note    Patient: Caroll Fabry  MRN: [id number]  YOB: 1950  Date of evaluation: 11/21/2017  Time:  4:43 PM     Procedure Summary     Date:  11/21/17 Room / Location:  [facility] 03 / [address]    Anesthesia Start:  4240 Anesthesia Stop:  5664    Procedure:  TOTAL KNEE ARTHROPLASTY LEFT WITH BIOMET AND GPS PRODUCT APPLICATION (Left Knee) Diagnosis:  (DJD LEFT KNEE)    Surgeon:  Doug Roper MD Responsible Provider:  Kyrie Boyer MD    Anesthesia Type:  spinal, regional, general ASA Status:  3          Anesthesia Type: spinal, regional, general    Yanick Phase I: Yanick Score: 8    Yanick Phase II:      Last vitals: Reviewed and per EMR flowsheets.        Anesthesia Post Evaluation

## 2017-11-21 NOTE — ANESTHESIA PROCEDURE NOTES
Peripheral Block    Patient location during procedure: PACU  Start time: 11/21/2017 12:15 PM  End time: 11/21/2017 12:35 PM  Staffing  Anesthesiologist: Luis Sadler  Performed: anesthesiologist   Preanesthetic Checklist  Completed: patient identified, site marked, surgical consent, pre-op evaluation, timeout performed, IV checked, risks and benefits discussed, monitors and equipment checked, anesthesia consent given, oxygen available and patient being monitored  Peripheral Block  Patient position: supine  Prep: ChloraPrep  Patient monitoring: cardiac monitor, continuous pulse ox, frequent blood pressure checks and IV access  Block type: Femoral  Laterality: left  Injection technique: catheter  Procedures: ultrasound guided  Local infiltration: lidocaine  Infiltration strength: 1 %  Dose: 10 mL  Adductor canal  Provider prep: mask and sterile gloves  Local infiltration: lidocaine  Needle  Needle type: Tuohy   Needle gauge: 18 G  Needle length: 5 cm  Needle insertion depth: 3 cm  Catheter type: closed end  Catheter size: 19 G  Catheter at skin depth: 5 cm  Test dose: negative  Assessment  Injection assessment: negative aspiration for heme, no paresthesia on injection and local visualized surrounding nerve on ultrasound  Paresthesia pain: none  Slow fractionated injection: yes  Hemodynamics: stable  Reason for block: post-op pain management

## 2017-11-22 VITALS
OXYGEN SATURATION: 93 % | HEIGHT: 69 IN | HEART RATE: 65 BPM | BODY MASS INDEX: 25.18 KG/M2 | DIASTOLIC BLOOD PRESSURE: 71 MMHG | SYSTOLIC BLOOD PRESSURE: 125 MMHG | WEIGHT: 170 LBS | TEMPERATURE: 98.1 F | RESPIRATION RATE: 17 BRPM

## 2017-11-22 LAB
HCT VFR BLD CALC: 40.3 % (ref 41–53)
HEMOGLOBIN: 13.5 G/DL (ref 13.5–17.5)

## 2017-11-22 PROCEDURE — 97116 GAIT TRAINING THERAPY: CPT

## 2017-11-22 PROCEDURE — 97161 PT EVAL LOW COMPLEX 20 MIN: CPT

## 2017-11-22 PROCEDURE — 97110 THERAPEUTIC EXERCISES: CPT

## 2017-11-22 PROCEDURE — G0009 ADMIN PNEUMOCOCCAL VACCINE: HCPCS | Performed by: FAMILY MEDICINE

## 2017-11-22 PROCEDURE — 97165 OT EVAL LOW COMPLEX 30 MIN: CPT

## 2017-11-22 PROCEDURE — G8987 SELF CARE CURRENT STATUS: HCPCS

## 2017-11-22 PROCEDURE — 99024 POSTOP FOLLOW-UP VISIT: CPT | Performed by: ORTHOPAEDIC SURGERY

## 2017-11-22 PROCEDURE — 85014 HEMATOCRIT: CPT

## 2017-11-22 PROCEDURE — 90670 PCV13 VACCINE IM: CPT | Performed by: FAMILY MEDICINE

## 2017-11-22 PROCEDURE — 6370000000 HC RX 637 (ALT 250 FOR IP): Performed by: ORTHOPAEDIC SURGERY

## 2017-11-22 PROCEDURE — 85018 HEMOGLOBIN: CPT

## 2017-11-22 PROCEDURE — 2580000003 HC RX 258: Performed by: ORTHOPAEDIC SURGERY

## 2017-11-22 PROCEDURE — 36415 COLL VENOUS BLD VENIPUNCTURE: CPT

## 2017-11-22 PROCEDURE — 97535 SELF CARE MNGMENT TRAINING: CPT

## 2017-11-22 PROCEDURE — 6370000000 HC RX 637 (ALT 250 FOR IP): Performed by: FAMILY MEDICINE

## 2017-11-22 PROCEDURE — 6360000002 HC RX W HCPCS: Performed by: ORTHOPAEDIC SURGERY

## 2017-11-22 PROCEDURE — G8988 SELF CARE GOAL STATUS: HCPCS

## 2017-11-22 PROCEDURE — 97530 THERAPEUTIC ACTIVITIES: CPT

## 2017-11-22 PROCEDURE — 6360000002 HC RX W HCPCS: Performed by: FAMILY MEDICINE

## 2017-11-22 RX ORDER — OXYCODONE HYDROCHLORIDE 10 MG/1
10 TABLET ORAL EVERY 4 HOURS PRN
Qty: 40 TABLET | Refills: 0 | Status: SHIPPED | OUTPATIENT
Start: 2017-11-22 | End: 2017-11-29

## 2017-11-22 RX ADMIN — ASPIRIN 325 MG: 325 TABLET, DELAYED RELEASE ORAL at 08:37

## 2017-11-22 RX ADMIN — OXYCODONE HYDROCHLORIDE 10 MG: 5 TABLET ORAL at 13:08

## 2017-11-22 RX ADMIN — PANTOPRAZOLE SODIUM 40 MG: 40 TABLET, DELAYED RELEASE ORAL at 05:40

## 2017-11-22 RX ADMIN — HYDROMORPHONE HYDROCHLORIDE 0.5 MG: 1 INJECTION, SOLUTION INTRAMUSCULAR; INTRAVENOUS; SUBCUTANEOUS at 03:18

## 2017-11-22 RX ADMIN — METOPROLOL TARTRATE 25 MG: 25 TABLET ORAL at 08:37

## 2017-11-22 RX ADMIN — DOCUSATE SODIUM 100 MG: 100 CAPSULE, LIQUID FILLED ORAL at 08:37

## 2017-11-22 RX ADMIN — Medication 10 ML: at 08:38

## 2017-11-22 RX ADMIN — KETOROLAC TROMETHAMINE 15 MG: 30 INJECTION, SOLUTION INTRAMUSCULAR at 05:39

## 2017-11-22 RX ADMIN — ACETAMINOPHEN 1000 MG: 500 TABLET ORAL at 05:39

## 2017-11-22 RX ADMIN — BUPROPION HYDROCHLORIDE 300 MG: 300 TABLET, EXTENDED RELEASE ORAL at 11:34

## 2017-11-22 RX ADMIN — WATER 2 G: 1 INJECTION INTRAMUSCULAR; INTRAVENOUS; SUBCUTANEOUS at 05:39

## 2017-11-22 RX ADMIN — PNEUMOCOCCAL 13-VALENT CONJUGATE VACCINE 0.5 ML: 2.2; 2.2; 2.2; 2.2; 2.2; 4.4; 2.2; 2.2; 2.2; 2.2; 2.2; 2.2; 2.2 INJECTION, SUSPENSION INTRAMUSCULAR at 12:17

## 2017-11-22 RX ADMIN — OXYCODONE HYDROCHLORIDE 10 MG: 5 TABLET ORAL at 08:37

## 2017-11-22 ASSESSMENT — PAIN SCALES - GENERAL
PAINLEVEL_OUTOF10: 2
PAINLEVEL_OUTOF10: 5
PAINLEVEL_OUTOF10: 5
PAINLEVEL_OUTOF10: 3
PAINLEVEL_OUTOF10: 4
PAINLEVEL_OUTOF10: 6
PAINLEVEL_OUTOF10: 3
PAINLEVEL_OUTOF10: 5
PAINLEVEL_OUTOF10: 6

## 2017-11-22 ASSESSMENT — PAIN DESCRIPTION - LOCATION
LOCATION: KNEE

## 2017-11-22 ASSESSMENT — PAIN DESCRIPTION - ORIENTATION
ORIENTATION: LEFT

## 2017-11-22 ASSESSMENT — PAIN DESCRIPTION - FREQUENCY
FREQUENCY: CONTINUOUS
FREQUENCY: CONTINUOUS

## 2017-11-22 ASSESSMENT — PAIN DESCRIPTION - DIRECTION: RADIATING_TOWARDS: BUTTOCKS

## 2017-11-22 ASSESSMENT — PAIN DESCRIPTION - PAIN TYPE
TYPE: SURGICAL PAIN

## 2017-11-22 ASSESSMENT — PAIN DESCRIPTION - DESCRIPTORS
DESCRIPTORS: ACHING
DESCRIPTORS: ACHING

## 2017-11-22 NOTE — PROGRESS NOTES
[address]   Occupational Therapy Evaluation  Date: 17  Patient Name: Chilango Stephens       Room: 9588/9969-68  MRN: [id number]  Account: [de-identified]   : 1950  (75 y.o.) Gender: male        Diagnosis: DJD of Left Knee, S/P Left TKA 17       Treatment Diagnosis: Impaired self care due to left TKA  Past Medical History:  has a past medical history of Acid indigestion; BPH (benign prostatic hyperplasia); COPD (chronic obstructive pulmonary disease) ([address] Depression; Full dentures; Hyperlipidemia; Hypertension; and Wears glasses. Past Surgical History:   has a past surgical history that includes Cataract removal (Bilateral); Abdominal exploration surgery (Right, ); Tonsillectomy; Colonoscopy; Cholecystectomy, laparoscopic (9/8/15); Foot surgery (Left); fracture surgery; and total knee arthroplasty (Left, 2017). Restrictions  Restrictions/Precautions: Weight Bearing, Fall Risk (Q-ball)  Required Braces or Orthoses?: No     Vitals  Temp: 98 °F (36.7 °C)  Pulse: 70  Resp: 16  BP: 130/70  Height: 5' 9\" (175.3 cm)  Weight: 170 lb (77.1 kg)  BMI (Calculated): 25.2  Oxygen Therapy  SpO2: 92 %  Pulse Oximeter Device Mode: Continuous  Pulse Oximeter Device Location: Left, Finger  O2 Device: None (Room air)  O2 Flow Rate (L/min): 2 L/min  Level of Consciousness: Alert    Subjective  Subjective: \" feeling rough, I didnot sleep well last night. \"  Comments: Pt. cooperative. Pt. somewhat impulsive. Pt. required vc's with walker safety and safe transfer tech.   Overall Orientation Status: Within Functional Limits  Vision  Vision: Impaired  Vision Exceptions: Wears glasses at all times  Hearing  Hearing: Within functional limits  Social/Functional History  Lives With: Spouse  Type of Home: House  Home Layout: One level  Home Access: Stairs to enter without rails  Entrance Stairs - Number of Steps: 2  Bathroom Shower/Tub: Walk-in shower, Doors  Beemer assistance  Functional Mobility  Functional - Mobility Device: Rolling Walker  Activity: To/from bathroom  Assist Level: Contact guard assistance  Functional Mobility Comments: Vc's with walker safety and hand placement with transitional movement. Bed mobility  Supine to Sit: Modified independent  Sit to Supine: Modified independent  Scooting: Modified independent     Transfers  Stand Step Transfers: Contact guard assistance (RW)  Sit to stand: Contact guard assistance  Stand to sit: Contact guard assistance  Shower Transfers  Shower - Transfer Type: To and From  Shower - Transfer To: Transfer tub bench  Shower - Technique: Ambulating  Shower Transfers: Contact Guard  Shower Transfers Comments: vc's with safe transfers and walker safety      Assessment  Performance deficits / Impairments: Decreased functional mobility , Decreased ADL status, Decreased balance, Decreased endurance  Treatment Diagnosis: Impaired self care due to left TKA  Prognosis: Good  Decision Making: Low Complexity  History: Medical Chart Review  Patient Education: OT POC, ADL's, modified tech. safe transfers, walker safety  Barriers to Learning: none  REQUIRES OT FOLLOW UP: Yes  Discharge Recommendations: Home with assist PRN  Activity Tolerance: Patient Tolerated treatment well         G CODE  OT G-codes  Functional Assessment Tool Used: [employer] ( 6 clicks )  Score: 04/60  Functional Limitation: Self care  Self Care Current Status ():  At least 1 percent but less than 20 percent impaired, limited or restricted  Self Care Goal Status (): 0 percent impaired, limited or restricted    Goals  Patient Goals   Patient goals : to go home as soom possible  Short term goals  Time Frame for Short term goals: by discharge,   Short term goal 1: Pt. will actively participate in Total Joint Program-left knee  Short term goal 2: Pt. will verbalize/demo good understanding of Total Joint Program -Left Knee  Short term goal 3: Pt. will verbalize /demo. good understanding of AE/DME/modified tech. for functional ADL's. Short term goal 4: Pt. will participate in 25-40 min. of functional activities/therapeutic ex. to promote increased indep.with ADL's and mobility. Plan  Safety Devices  Safety Devices in place: Yes  Type of devices: Nurse notified, Left in bed, Call light within reach  Restraints  Initially in place: No     Plan  Times per week: 4-5x/week  Times per day: Daily  Current Treatment Recommendations: Endurance Training, Functional Mobility Training, Strengthening, Balance Training, Self-Care / ADL, Patient/Caregiver Education & Training, Equipment Evaluation, Education, & procurement, Safety Education & Training    OT Equipment Recommendations  Equipment Needed: Yes  Mobility Devices: ADL Assistive Devices  ADL Assistive Devices:  Shower Chair with back, Grab Bars - shower  OT Individual Minutes  Time In: 0815  Time Out: [address]  Minutes: 35    Electronically signed by JEANINE Sen on 11/22/17 at 11:29 AM

## 2017-11-22 NOTE — PROGRESS NOTES
DR Yazan Sandoval                                                                       PROGRESS NOTE        Patient:  Vandana Martinez  YOB: 1950    MRN: [id number]     Acct: [de-identified]     Admit date: 11/21/2017    Pt seen and Chart reviewed. Consultant notes reviewed and care evaluated. Subjective: 68y/o white male with svere djd and pain left knee under went total left knee doing ok has some pain but controlled so far had little nausea while eating this am but no vomiting ,ok now,no cp or sob. wants to go home and do rehab at home not ecf. Diet:  DIET GENERAL;      Medications:Current Inpatient    Scheduled Meds:   sodium chloride flush  10 mL Intravenous 2 times per day    acetaminophen  1,000 mg Oral 3 times per day    ketorolac  15 mg Intravenous 3 times per day    docusate sodium  100 mg Oral BID    aspirin  325 mg Oral BID    atorvastatin  20 mg Oral Nightly    metoprolol tartrate  25 mg Oral Daily    pantoprazole  40 mg Oral QAM AC    buPROPion  300 mg Oral QAM    tamsulosin  0.4 mg Oral Nightly    pneumococcal 13-valent conjugate  0.5 mL Intramuscular Once     Continuous Infusions:   lactated ringers 125 mL/hr at 11/21/17 2252    lactated ringers 125 mL/hr at 11/21/17 1149    bupivacaine 0.125%      bupivacaine 0.125% 750 mL (11/21/17 1459)     PRN Meds:sodium chloride flush, oxyCODONE **OR** oxyCODONE, HYDROmorphone **OR** HYDROmorphone, ondansetron    Objective:    Physical Exam:  Vitals: /70   Pulse 70   Temp 98 °F (36.7 °C) (Oral)   Resp 16   Ht 5' 9\" (1.753 m)   Wt 170 lb (77.1 kg)   SpO2 92%   BMI 25.10 kg/m²   24 hour intake/output:  Intake/Output Summary (Last 24 hours) at 11/22/17 0829  Last data filed at 11/22/17 0318   Gross per 24 hour   Intake             2425 ml   Output             1123 ml   Net             1302 ml     Last 3 weights:   Wt Readings from Last 3 Encounters:   11/21/17

## 2017-11-22 NOTE — PLAN OF CARE
Problem: Falls - Risk of  Goal: Absence of falls  Outcome: Ongoing  No falls this shift. Call light within reach and siderails x2. Bed in lowest position. Patient safety maintained. Problem: Pain:  Goal: Pain level will decrease  Pain level will decrease   Outcome: Ongoing  Pain decreased with prescribed medication. Will continue to monitor.

## 2017-11-22 NOTE — PROGRESS NOTES
Physical Therapy    Facility/Department: [facility] MED SURG  Initial Assessment    NAME: Miriam Marroquin  : 1950  MRN: [id number]    Date of Service: 2017    Patient Diagnosis(es): There were no encounter diagnoses. has a past medical history of Acid indigestion; BPH (benign prostatic hyperplasia); COPD (chronic obstructive pulmonary disease) ([address] Depression; Full dentures; Hyperlipidemia; Hypertension; and Wears glasses. has a past surgical history that includes Cataract removal (Bilateral); Abdominal exploration surgery (Right, ); Tonsillectomy; Colonoscopy; Cholecystectomy, laparoscopic (9/8/15); Foot surgery (Left); fracture surgery; and total knee arthroplasty (Left, 2017). Restrictions  Restrictions/Precautions  Restrictions/Precautions: Weight Bearing, Fall Risk (Q-ball)  Required Braces or Orthoses?: No  Implants present? :  (L TKA 17)  Lower Extremity Weight Bearing Restrictions  Left Lower Extremity Weight Bearing: Weight Bearing As Tolerated  Vision/Hearing  Vision: Impaired  Vision Exceptions: Wears glasses at all times  Hearing: Within functional limits     Subjective  General  Patient assessed for rehabilitation services?: Yes  Additional Pertinent Hx: Admitted on 17 for L TKA. Referral Date : 17  Diagnosis: DJD L TKA, S/P L TKA  Follows Commands: Within Functional Limits  Pain Screening  Patient Currently in Pain: Yes  Pain Assessment  Pain Assessment: 0-10  Pain Level: 5  Pain Location: Knee  Pain Orientation: Left  Pain Descriptors: Aching  Pain Frequency: Continuous  Pain Intervention(s): Medication (see eMar);Repositioned; Ambulation/Increased activity (Q ball)  Response to Pain Intervention: Patient Satisfied  Vital Signs  BP Location: Left Arm  Level of Consciousness: Alert  Patient Currently in Pain: Yes  Oxygen Therapy  O2 Device: None (Room air)       Orientation       Social/Functional History  Social/Functional History  Lives With: Spouse  Type

## 2017-11-22 NOTE — PROGRESS NOTES
Post Operative Progress Note    11/22/2017 1:55 PM  Info:   Admit Date: 11/21/2017  PCP: Theresa Mike DO      Medications:   Scheduled Meds:   sodium chloride flush  10 mL Intravenous 2 times per day    acetaminophen  1,000 mg Oral 3 times per day    ketorolac  15 mg Intravenous 3 times per day    docusate sodium  100 mg Oral BID    aspirin  325 mg Oral BID    atorvastatin  20 mg Oral Nightly    metoprolol tartrate  25 mg Oral Daily    pantoprazole  40 mg Oral QAM AC    buPROPion  300 mg Oral QAM    tamsulosin  0.4 mg Oral Nightly     Continuous Infusions:   lactated ringers 125 mL/hr at 11/21/17 1149    bupivacaine 0.125% 750 mL (11/21/17 1459)     PRN Meds:sodium chloride flush, oxyCODONE **OR** oxyCODONE, HYDROmorphone **OR** HYDROmorphone, ondansetron    Diet:   Diet: DIET GENERAL;    Subjective:   Systemic or Specific Complaints:No Complaints and Pain Control    Objective:     Patient Vitals for the past 24 hrs:   BP Temp Temp src Pulse Resp SpO2   11/22/17 1215 125/71 98.1 °F (36.7 °C) - 65 17 93 %   11/22/17 0612 130/70 98 °F (36.7 °C) Oral 70 16 92 %   11/21/17 2333 126/69 98 °F (36.7 °C) Oral 68 15 94 %   11/21/17 1730 (!) 151/80 97.8 °F (36.6 °C) Oral 72 12 94 %   11/21/17 1628 (!) 151/87 97.3 °F (36.3 °C) Oral 71 12 93 %   11/21/17 1540 133/67 - - 66 10 95 %   11/21/17 1530 (!) 141/72 97.5 °F (36.4 °C) Infrared 69 8 94 %   11/21/17 1520 133/65 - - 72 15 93 %   11/21/17 1510 123/86 - - 80 19 93 %   11/21/17 1500 122/68 - - 81 16 94 %   11/21/17 1450 109/61 - - 94 19 94 %   11/21/17 1441 (!) 98/54 97.2 °F (36.2 °C) Infrared 96 16 95 %         Wound: incision clean and dry without sign of infection   Motion: expected discomfort with range of motion in affected extremity   DVT Exam:  no evidence of DVT on physical examination         Data Review  CBC:   Recent Labs      11/22/17   0542   HGB  13.5           Assessment:   Patient is S/P left Knee, Arthoplasty  Pain is well controlled.  He has no nausea and no vomiting.     Current activity is up with assistance        Plan:      1:  Continue Physical Therapy  2:  Continue Deep venous thrombosis prophylaxis  3:  Continue Pain Control  4:  Discharge plans home       Electronically signed by Cullen Lake MD on 11/22/2017 at 1:55 PM

## 2017-11-22 NOTE — CARE COORDINATION
[facility]  DISCHARGE PLANS    Patient:     Joel Watts     Admitted for :    Primary osteoarthritis of left knee [M17.12]    Admission Date:     11/21/2017     Room #:    2036      DISCHARGE PLAN:          You are discharging home with [address]:    Your Follow-Up Appointment for Physical Therapy:  Appointment:  Fuentes Sampson will call you to set up first visit  Company:  [facility]  Address:  [facility] [address], [place], [address]  Phone:   [phone]        Your Follow-Up Appointment with your surgeon is:  Appointment:  Monday December 4th at 2:55 pm  Surgeon's Name:  Dr Chevy Howard  Phone:   [phone]    EQUIPMENT: You have stated that you do not need a walker. You already have a rolling walker with wheels. ANTICOAGULATION MEDICATION:  The anticoagulation medication your doctor has ordered to prevent blood clots, is Aspirin 325 mg TWICE daily     DISCHARGE DATE:  Your anticipated day of discharge from [facility] is:  11/22              TIPS FOR A SUCCESSFUL RECOVERY:       ACTIVITY/REST:  Balance your activity with rest.  Do not sit for too long but also do not  overdo your activity level - sitting for too long will cause stiffness, but   overdoing activity may cause you to become tired and weak and                     increase your chances of falling, as well as increase your swelling  and pain level. BALANCE is the key! ELEVATION/ICE:  Make sure to elevate your leg, and ice your surgical site, after all    activity in order to help reduce swelling and pain. Keep the knee     elevated above the level of the heart to reduce swelling. AVOIDING CONSTIPATION:  Narcotic pain medications can cause constipation as a side effect. To help prevent you from becoming constipated, make sure to drink lots  of fluids and eat lots of fiber-rich foods, such as fruits, vegetables and     nuts. Continue to take your stool softener daily until you stop taking the   narcotic pain medication. WOUND HEALING:  Make sure to eat lots of protein, vitamin C and iron rich foods to help your  body with the healing process. Avoid eating lots of sugary foods. Bacteria loves sugar and we don't want to give bacteria a fighting chance! Do not remove dressing until [facility] [place]. You may shower. FOLLOW UP:  Congratulations on your new joint! It has been my pleasure to take part in   your recovery process and help you with your discharge plans. Let me  know if there is anything else I can do for you. Please anticipate a phone  call, from me, approximately the day after you discharge, to monitor your progress, answer any of your questions, and get your feedback on your transition to home and your current physical therapy program. You may also reach me at [phone] if you have any questions once you are home.     _____________________________________________________________________    CONTACT: Rodolfo Santana RN - Orthopedic Nurse Navigator         ELECTRONIC SIGNATURE:    Rodolfo Santana RN on 11/22/2017 at 11:53 AM     Orthopedic Nurse Navigator    [facility]      Office:  [phone]     Cell:  [phone]        Understanding Your On-Q Pain Pump    On-Q Pain Pump:  The On-Q system is a small disposable pump filled with a local anesthetic medication to relieve pain after surgery. The pump is connected to a small catheter (tube) which is inserted in the leg by the surgeon or anesthesiologist.  The pump continuously delivers medication that blocks pain in the area of the procedure. As the medication is delivered, the pump ball will gradually become smaller. The pump has a carrying case to allow freedom of movement. (The select-a-flow controller allows for adjustment of the amount of medication being delivered based on pain needs.   Adjust the flow rate ONLY if instructed

## 2017-11-22 NOTE — DISCHARGE INSTR - DIET

## 2017-11-24 ENCOUNTER — CARE COORDINATION (OUTPATIENT)
Dept: CASE MANAGEMENT | Age: 67
End: 2017-11-24

## 2017-11-24 ENCOUNTER — TELEPHONE (OUTPATIENT)
Dept: ORTHOPEDIC SURGERY | Age: 67
End: 2017-11-24

## 2017-12-01 ENCOUNTER — TELEPHONE (OUTPATIENT)
Dept: ORTHOPEDIC SURGERY | Age: 67
End: 2017-12-01

## 2017-12-04 ENCOUNTER — OFFICE VISIT (OUTPATIENT)
Dept: ORTHOPEDIC SURGERY | Age: 67
End: 2017-12-04

## 2017-12-04 DIAGNOSIS — Z96.652 STATUS POST TOTAL LEFT KNEE REPLACEMENT: Primary | ICD-10-CM

## 2017-12-04 PROCEDURE — 99024 POSTOP FOLLOW-UP VISIT: CPT | Performed by: ORTHOPAEDIC SURGERY

## 2017-12-06 ENCOUNTER — CARE COORDINATION (OUTPATIENT)
Dept: CASE MANAGEMENT | Age: 67
End: 2017-12-06

## 2017-12-07 DIAGNOSIS — Z96.652 STATUS POST TOTAL LEFT KNEE REPLACEMENT: Primary | ICD-10-CM

## 2017-12-21 ENCOUNTER — CARE COORDINATION (OUTPATIENT)
Dept: CASE MANAGEMENT | Age: 67
End: 2017-12-21

## 2017-12-21 DIAGNOSIS — Z86.010 HISTORY OF COLON POLYPS: Primary | ICD-10-CM

## 2017-12-21 NOTE — CARE COORDINATION
Spoke with Kaiden Park    Incision status: Stated healing well. Edema/Swelling: Indicated continued swelling. Denies increased swelling. Pain level and status: Indicated continued pain. Denies increased pain. Use of pain medications: Indicated using OTC Tylenol. Bowels: Denies    [address]  active:     Outpatient therapy: PT Services in [place]    Do you have all of your medications:     Changes in medications:      Follow up appointments:    Future Appointments  Date Time Provider Rose Sands   1/17/2018 2:05 PM Brenda George MD [facility]

## 2017-12-26 RX ORDER — POLYETHYLENE GLYCOL 3350 17 G/17G
POWDER, FOR SOLUTION ORAL
Qty: 255 G | Refills: 0 | Status: SHIPPED | OUTPATIENT
Start: 2017-12-26 | End: 2018-01-20

## 2018-01-12 ENCOUNTER — TELEPHONE (OUTPATIENT)
Dept: ORTHOPEDIC SURGERY | Age: 68
End: 2018-01-12

## 2018-01-12 DIAGNOSIS — M17.12 PRIMARY OSTEOARTHRITIS OF LEFT KNEE: Primary | ICD-10-CM

## 2018-01-17 ENCOUNTER — OFFICE VISIT (OUTPATIENT)
Dept: ORTHOPEDIC SURGERY | Age: 68
End: 2018-01-17

## 2018-01-17 DIAGNOSIS — Z96.652 STATUS POST TOTAL LEFT KNEE REPLACEMENT: Primary | ICD-10-CM

## 2018-01-17 PROCEDURE — 99024 POSTOP FOLLOW-UP VISIT: CPT | Performed by: ORTHOPAEDIC SURGERY

## 2018-02-12 ENCOUNTER — HOSPITAL ENCOUNTER (OUTPATIENT)
Age: 68
Setting detail: OUTPATIENT SURGERY
Discharge: HOME | End: 2018-02-12
Attending: INTERNAL MEDICINE | Admitting: INTERNAL MEDICINE
Payer: MEDICARE

## 2018-02-12 VITALS
TEMPERATURE: 98.2 F | WEIGHT: 165 LBS | OXYGEN SATURATION: 98 % | BODY MASS INDEX: 24.44 KG/M2 | DIASTOLIC BLOOD PRESSURE: 76 MMHG | SYSTOLIC BLOOD PRESSURE: 113 MMHG | HEIGHT: 69 IN | RESPIRATION RATE: 16 BRPM | HEART RATE: 78 BPM

## 2018-02-12 PROCEDURE — 6360000002 HC RX W HCPCS: Performed by: INTERNAL MEDICINE

## 2018-02-12 PROCEDURE — 3609010300 HC COLONOSCOPY W/BIOPSY SINGLE/MULTIPLE: Performed by: INTERNAL MEDICINE

## 2018-02-12 PROCEDURE — 45380 COLONOSCOPY AND BIOPSY: CPT | Performed by: INTERNAL MEDICINE

## 2018-02-12 PROCEDURE — 99152 MOD SED SAME PHYS/QHP 5/>YRS: CPT | Performed by: INTERNAL MEDICINE

## 2018-02-12 PROCEDURE — 7100000010 HC PHASE II RECOVERY - FIRST 15 MIN: Performed by: INTERNAL MEDICINE

## 2018-02-12 PROCEDURE — 2580000003 HC RX 258: Performed by: INTERNAL MEDICINE

## 2018-02-12 PROCEDURE — 7100000011 HC PHASE II RECOVERY - ADDTL 15 MIN: Performed by: INTERNAL MEDICINE

## 2018-02-12 PROCEDURE — 88305 TISSUE EXAM BY PATHOLOGIST: CPT

## 2018-02-12 PROCEDURE — 99153 MOD SED SAME PHYS/QHP EA: CPT | Performed by: INTERNAL MEDICINE

## 2018-02-12 RX ORDER — MIDAZOLAM HYDROCHLORIDE 1 MG/ML
INJECTION INTRAMUSCULAR; INTRAVENOUS PRN
Status: DISCONTINUED | OUTPATIENT
Start: 2018-02-12 | End: 2018-02-12 | Stop reason: HOSPADM

## 2018-02-12 RX ORDER — MEPERIDINE HYDROCHLORIDE 50 MG/ML
INJECTION INTRAMUSCULAR; INTRAVENOUS; SUBCUTANEOUS PRN
Status: DISCONTINUED | OUTPATIENT
Start: 2018-02-12 | End: 2018-02-12 | Stop reason: HOSPADM

## 2018-02-12 RX ORDER — SODIUM CHLORIDE, SODIUM LACTATE, POTASSIUM CHLORIDE, CALCIUM CHLORIDE 600; 310; 30; 20 MG/100ML; MG/100ML; MG/100ML; MG/100ML
INJECTION, SOLUTION INTRAVENOUS CONTINUOUS
Status: DISCONTINUED | OUTPATIENT
Start: 2018-02-12 | End: 2018-02-12 | Stop reason: HOSPADM

## 2018-02-12 RX ADMIN — SODIUM CHLORIDE, POTASSIUM CHLORIDE, SODIUM LACTATE AND CALCIUM CHLORIDE: 600; 310; 30; 20 INJECTION, SOLUTION INTRAVENOUS at 09:19

## 2018-02-12 ASSESSMENT — PAIN DESCRIPTION - PAIN TYPE: TYPE: ACUTE PAIN

## 2018-02-12 ASSESSMENT — PAIN SCALES - GENERAL
PAINLEVEL_OUTOF10: 0
PAINLEVEL_OUTOF10: 3

## 2018-02-12 ASSESSMENT — PAIN DESCRIPTION - LOCATION: LOCATION: ABDOMEN

## 2018-02-12 ASSESSMENT — PAIN DESCRIPTION - DESCRIPTORS: DESCRIPTORS: CRAMPING

## 2018-02-12 ASSESSMENT — PAIN - FUNCTIONAL ASSESSMENT: PAIN_FUNCTIONAL_ASSESSMENT: 0-10

## 2018-02-12 NOTE — H&P
HISTORY and [address]       NAME:  Shantell Roa  MRN: [id number]   YOB: 1950   Date: 2/12/2018   Age: 75 y.o. Gender: male       COMPLAINT AND PRESENT HISTORY:   75 y o male with colonoscopy about 18 years ago with couple polyps. Some slight constipation,  Denies diarrhea, or black/bloody stool. Has a hemorrhoid. PAST MEDICAL HISTORY     Past Medical History:   Diagnosis Date    Acid indigestion     a little    BPH (benign prostatic hyperplasia)     COPD (chronic obstructive pulmonary disease) (HCC)     Depression     Full dentures     Hyperlipidemia     Hypertension     Wears glasses        Pt denies any history of Diabetes mellitus type 2, stroke, heart disease,  Asthma, GERD,  Cancer, Seizures,Thyroid disease, Kidney Disease, Hepatitis, TB, Psychiatric Disorders or Substance abuse. SURGICAL HISTORY       Past Surgical History:   Procedure Laterality Date    ABDOMINAL EXPLORATION SURGERY Right 1968    'kidney nicked by gilmer\" 2 PIECES REMAIN TO RT. KIDNEY.     CATARACT REMOVAL Bilateral     iol's    CHOLECYSTECTOMY, LAPAROSCOPIC  9/8/15    COLONOSCOPY      FOOT SURGERY Left     FRACTURE SURGERY      left leg 1970, no hardware    JOINT REPLACEMENT      right knee    NH TOTAL KNEE ARTHROPLASTY Left 11/21/2017    TOTAL KNEE ARTHROPLASTY LEFT WITH BIOMET AND GPS PRODUCT APPLICATION performed by Mateo Ames MD at [address]       Family History   Problem Relation Age of Onset    Cancer Mother      throat    COPD Father     Other Father      mesothelioma       SOCIAL HISTORY       Social History     Social History    Marital status:      Spouse name: N/A    Number of children: N/A    Years of education: N/A     Social History Main Topics    Smoking status: Former Smoker     Quit date: 6/21/2009    Smokeless tobacco: Never Used      Comment: QUIT SMOKING 2009    Alcohol use No

## 2018-02-13 LAB — SURGICAL PATHOLOGY REPORT: NORMAL

## 2018-02-20 PROBLEM — K63.5 COLON POLYP: Status: ACTIVE | Noted: 2018-02-12

## 2019-05-09 NOTE — CARE COORDINATION
[facility]  DVT Prophylaxis and Vaccine Status  Work List  Mandatory for all patients      Patient must be on both Chemical prophylaxis and Mechanical prophylaxis. If chemical/mechanical prophylaxis is not ordered, the physician must document a reason for not using prophylaxis     Chemical Prophylaxis  Is patient on chemical prophylaxis: Yes  If no chemical prophylaxis Is a order in for No Chemical VTE prophylaxis na  If no was the physician notified not applicable      Mechanical Prophylaxis  Is patient on mechanical prophylaxis, intermittent pneumatic compression device: Yes  If no was the physician notified not applicable        Pneumonia Vaccine  Vaccine indicated: yes  If indicated was the vaccine given: wants to wait and check with wife whether he received    Influenza Vaccine (applicable from October through March):  Vaccine indicated: Up-to-date  If indicated was the vaccine given: not applicable    Patient Education  Education completed on DVT prophylaxis: yes              ADMISSION NOTE       Patient admitted to room  *2036      Time of admit:  1600    Admit from:  PACU    Reason for admission:  TKA    Where patient has been residing for the last 24 hrs:  home    Has the patient been admitted to any facility in the last 4 weeks, which one:  no    Family at bedside:  yes    Patient is currently in pain no  Patient has been oriented to room, educated on how to use call light, and to call for assistance prior to getting up. Bed in lowest and locked position. 2 siderails up for safety. Call light within reach. [facility]  SCIP  Core Measure Compliance  Work List    24 hours from Anesthesia End Time is: 4586 on 11-21-17    VTE must be administered and applied within 24 hours of anesthesia end time. yes  Patient should have both Chemical and Mechanical  Prophylaxis    Beta Blocker  It is recommended that patients take their beta blocker the day of surgery.   Was the patient on a beta blocker prior to surgery? Yes  Did the physician reorder the beta blocker for post op medications? yes  If the physician did not reorder the beta blocker was he notified not applicable      Prophylactic Post Op Antibiotics  Is the antibiotic scheduled to end within 24 hours of Anesthesia end time? Yes    Waller  Does patient have a waller? No  It is recommended that the waller be discontinued by POD#2.  Place a sticky note to the physician to remind him/her that the waller should be out by the following date: na obese

## 2020-10-27 ENCOUNTER — HOSPITAL ENCOUNTER (OUTPATIENT)
Age: 70
Discharge: HOME | End: 2020-10-27
Payer: MEDICARE

## 2020-10-27 LAB
ABSOLUTE EOS #: 0.1 K/UL (ref 0–0.4)
ABSOLUTE IMMATURE GRANULOCYTE: ABNORMAL K/UL (ref 0–0.3)
ABSOLUTE LYMPH #: 1 K/UL (ref 1–4.8)
ABSOLUTE MONO #: 0.9 K/UL (ref 0.1–1.3)
ALBUMIN SERPL-MCNC: 4.3 G/DL (ref 3.5–5.2)
ALBUMIN/GLOBULIN RATIO: NORMAL (ref 1–2.5)
ALP BLD-CCNC: 60 U/L (ref 40–129)
ALT SERPL-CCNC: 16 U/L (ref 5–41)
ANION GAP SERPL CALCULATED.3IONS-SCNC: 12 MMOL/L (ref 9–17)
AST SERPL-CCNC: 22 U/L
BASOPHILS # BLD: 1 % (ref 0–2)
BASOPHILS ABSOLUTE: 0 K/UL (ref 0–0.2)
BILIRUB SERPL-MCNC: 0.67 MG/DL (ref 0.3–1.2)
BILIRUBIN DIRECT: 0.16 MG/DL
BILIRUBIN, INDIRECT: 0.51 MG/DL (ref 0–1)
BUN BLDV-MCNC: 15 MG/DL (ref 8–23)
BUN/CREAT BLD: ABNORMAL (ref 9–20)
CALCIUM SERPL-MCNC: 9.7 MG/DL (ref 8.6–10.4)
CHLORIDE BLD-SCNC: 103 MMOL/L (ref 98–107)
CHOLESTEROL, FASTING: 141 MG/DL
CHOLESTEROL/HDL RATIO: 3.3
CO2: 23 MMOL/L (ref 20–31)
CREAT SERPL-MCNC: 0.76 MG/DL (ref 0.7–1.2)
DIFFERENTIAL TYPE: ABNORMAL
EOSINOPHILS RELATIVE PERCENT: 1 % (ref 0–4)
FOLATE: 19 NG/ML
GFR AFRICAN AMERICAN: >60 ML/MIN
GFR NON-AFRICAN AMERICAN: >60 ML/MIN
GFR SERPL CREATININE-BSD FRML MDRD: ABNORMAL ML/MIN/{1.73_M2}
GFR SERPL CREATININE-BSD FRML MDRD: ABNORMAL ML/MIN/{1.73_M2}
GLOBULIN: NORMAL G/DL (ref 1.5–3.8)
GLUCOSE BLD-MCNC: 120 MG/DL (ref 70–99)
HCT VFR BLD CALC: 48 % (ref 41–53)
HDLC SERPL-MCNC: 43 MG/DL
HEMOGLOBIN: 16.1 G/DL (ref 13.5–17.5)
IMMATURE GRANULOCYTES: ABNORMAL %
LDL CHOLESTEROL: 72 MG/DL (ref 0–130)
LYMPHOCYTES # BLD: 13 % (ref 24–44)
MCH RBC QN AUTO: 29.4 PG (ref 26–34)
MCHC RBC AUTO-ENTMCNC: 33.6 G/DL (ref 31–37)
MCV RBC AUTO: 87.3 FL (ref 80–100)
MONOCYTES # BLD: 11 % (ref 1–7)
NRBC AUTOMATED: ABNORMAL PER 100 WBC
PDW BLD-RTO: 13.3 % (ref 11.5–14.9)
PLATELET # BLD: 178 K/UL (ref 150–450)
PLATELET ESTIMATE: ABNORMAL
PMV BLD AUTO: 7.6 FL (ref 6–12)
POTASSIUM SERPL-SCNC: 4.6 MMOL/L (ref 3.7–5.3)
RBC # BLD: 5.49 M/UL (ref 4.5–5.9)
RBC # BLD: ABNORMAL 10*6/UL
SEG NEUTROPHILS: 74 % (ref 36–66)
SEGMENTED NEUTROPHILS ABSOLUTE COUNT: 5.7 K/UL (ref 1.3–9.1)
SODIUM BLD-SCNC: 138 MMOL/L (ref 135–144)
THYROXINE, FREE: 1.09 NG/DL (ref 0.93–1.7)
TOTAL PROTEIN: 7 G/DL (ref 6.4–8.3)
TRIGLYCERIDE, FASTING: 131 MG/DL
TSH SERPL DL<=0.05 MIU/L-ACNC: 1.12 MIU/L (ref 0.3–5)
VITAMIN B-12: 641 PG/ML (ref 232–1245)
VITAMIN D 25-HYDROXY: 37.6 NG/ML (ref 30–100)
VLDLC SERPL CALC-MCNC: NORMAL MG/DL (ref 1–30)
WBC # BLD: 7.8 K/UL (ref 3.5–11)
WBC # BLD: ABNORMAL 10*3/UL

## 2020-10-27 PROCEDURE — 82746 ASSAY OF FOLIC ACID SERUM: CPT

## 2020-10-27 PROCEDURE — 84439 ASSAY OF FREE THYROXINE: CPT

## 2020-10-27 PROCEDURE — 82607 VITAMIN B-12: CPT

## 2020-10-27 PROCEDURE — 84443 ASSAY THYROID STIM HORMONE: CPT

## 2020-10-27 PROCEDURE — 80061 LIPID PANEL: CPT

## 2020-10-27 PROCEDURE — 85025 COMPLETE CBC W/AUTO DIFF WBC: CPT

## 2020-10-27 PROCEDURE — 82306 VITAMIN D 25 HYDROXY: CPT

## 2020-10-27 PROCEDURE — 36415 COLL VENOUS BLD VENIPUNCTURE: CPT

## 2020-10-27 PROCEDURE — 80048 BASIC METABOLIC PNL TOTAL CA: CPT

## 2020-10-27 PROCEDURE — 80076 HEPATIC FUNCTION PANEL: CPT

## 2022-01-24 NOTE — PROGRESS NOTES
137/92   Pulse 90   Temp 99.1 °F (37.3 °C) (Oral)   Resp 16   Ht 5' 6\" (1.676 m)   SpO2 100%   BMI 19.21 kg/m²     HEENT: No pallor, no icterus. Respiratory:  CTA, good air entry. Cardiovascular: RRR, no murmur. Abdomen: Soft, non-tender, BS noted. Neurologic: awake, alert and following commands       Assessment/Plan:  Active Hospital Problems    Diagnosis Date Noted    Acute respiratory failure with hypoxia (HCC) [J96.01] 07/31/2021     · Hypertensive emergency  · COVID 19 pneumonia  · Pulmonary edema  · ESRD on dialysis  · Transaminitis    Plan:  · Continue Decadron, monitor inflammatory markers  · Blood pressure better controlled, continue with current medication, off nicardipine drip  · Follow-up with nephrology, scheduled for dialysis  · Continue to monitor LFTs, significant worsening of AST and ALT noted      Labs:   Recent Labs     01/22/22  1307 01/23/22  0419 01/24/22  0445   WBC 4.6 7.8 14.0*   HGB 9.7* 9.3* 9.0*   HCT 31.9* 30.7* 28.8*    123* 146     Recent Labs     01/23/22  0419 01/23/22  0940 01/24/22  0445    135 135   K 6.9* 6.1* 4.8   CL 94* 95* 93*   CO2 18* 22 25   BUN 54* 64* 52*   CREATININE 8.2* 9.1* 6.4*   CALCIUM 9.5 9.8 9.2   PHOS 8.2*  --  6.6*     Recent Labs     01/22/22  1307 01/23/22  0419 01/24/22  0445   AST 37 365* 1,616*   ALT 18 224* 1,720*   BILITOT 0.6 0.8 0.8   ALKPHOS 87 96 92     No results for input(s): INR in the last 72 hours. No results for input(s): Faustino Killings in the last 72 hours.     Medications:  Reviewed    Infusion Medications    [Held by provider] niCARdipene (CARDENE) 50 mg in 250 mL 0.9 % sodium chloride infusion Stopped (01/23/22 1100)    dextrose      sodium chloride       Scheduled Medications    vitamin D  50,000 Units Oral Weekly    Followed by   Mayito Gamez ON 4/17/2022] Vitamin D  1,000 Units Oral Daily    polyethylene glycol  17 g Oral Daily    vancomycin (VANCOCIN) intermittent dosing (placeholder)   Other RX Father      mesothelioma           Orders Placed This Encounter   Procedures    XR KNEE LEFT (1-2 VIEWS)     Standing Status:   Future     Number of Occurrences:   1     Standing Expiration Date:   12/4/2018       1. Status post total left knee replacement        Gay Ndiaye MD    Please note that this chart was generated using voice recognition Dragon dictation software. Although every effort was made to ensure the accuracy of this automated transcription, some errors in transcription may have occurred. Placeholder    nicotine  1 patch TransDERmal Daily    albuterol-ipratropium  1 puff Inhalation 4 times per day    dexamethasone  6 mg IntraVENous Q24H    ascorbic acid  500 mg Oral Daily    zinc sulfate  50 mg Oral Daily    b complex-C-folic acid  1 mg Oral Daily    cloNIDine  0.3 mg Oral TID    hydrALAZINE  50 mg Oral TID    labetalol  300 mg Oral BID    losartan  100 mg Oral QPM    megestrol  20 mg Oral TID    NIFEdipine  60 mg Oral Daily    pantoprazole  40 mg Oral Daily    sevelamer  1,600 mg Oral TID    sodium chloride flush  5-40 mL IntraVENous 2 times per day    heparin (porcine)  5,000 Units SubCUTAneous Q8H    cefepime  1,000 mg IntraVENous Q24H     PRN Meds: glucose, dextrose, glucagon (rDNA), dextrose, ondansetron, hydrALAZINE, benzonatate, diphenhydrAMINE, melatonin, sodium chloride flush, sodium chloride, morphine      Intake/Output Summary (Last 24 hours) at 1/24/2022 0945  Last data filed at 1/23/2022 1834  Gross per 24 hour   Intake 155 ml   Output 2000 ml   Net -1845 ml     · Body mass index is 19.21 kg/m². · Diet  · ADULT DIET; Regular; Low Sodium (2 gm); Low Potassium (Less than 3000 mg/day)    · Code Status  · Full Code       Electronically signed by Neeru Johnson MD on 1/24/2022 at [address] [facility]   Please contact me through perfect serve    NOTE: This report was transcribed using voice recognition software. Every effort was made to ensure accuracy; however, inadvertent computerized transcription errors may be present.

## (undated) DEVICE — SOLUTION IV IRRIG WATER 1000ML POUR BRL 2F7114

## (undated) DEVICE — DEFENDO AIR WATER SUCTION AND BIOPSY VALVE KIT FOR  OLYMPUS: Brand: DEFENDO AIR/WATER/SUCTION AND BIOPSY VALVE

## (undated) DEVICE — Device: Brand: LEVEL 1

## (undated) DEVICE — DRAPE EQUIP STAND XL MAYO CVR

## (undated) DEVICE — KIT SEP W/ BLD DRAW TB SYR NDL TRNQT PD

## (undated) DEVICE — FORCEPS BX L240CM WRK CHN 2.8MM STD CAP W/ NDL MIC MESH

## (undated) DEVICE — GLOVE SURG SZ 85 STD WHT LTX SYN POLYMER BEAD REINF ANTI RL

## (undated) DEVICE — SHEET BD X PROTECT-A-BED

## (undated) DEVICE — DUP USE 310781 BLADE SAW SAG 29MMX83.7MMX1.32MM

## (undated) DEVICE — SOLUTION IV IRRIG POUR BRL 0.9% SODIUM CHL 2F7124

## (undated) DEVICE — CEMENT MIXING SYSTEM WITH FEMORAL BREAKWAY NOZZLE: Brand: REVOLUTION

## (undated) DEVICE — SOLUTION IV 1000ML 0.9% SOD CHL PH 5 INJ USP VIAFLX PLAS

## (undated) DEVICE — DUAL CUT SAGITTAL BLADE

## (undated) DEVICE — HANDPIECE SET WITH COAXIAL MULTI-ORIFICE TIP AND SUCTION TUBE: Brand: INTERPULSE

## (undated) DEVICE — SUTURE VCRL SZ 0 L36IN ABSRB UD CT-1 L36MM 1/2 CIR TAPR PNT VCP946H

## (undated) DEVICE — Z DISCONTINUED PER MEDLINE USE 2741943 DRESSING AQUACEL 10 IN ALG W9XL25CM SIL CVR WTRPRF VIR BACT BARR ANTIMIC

## (undated) DEVICE — SUTURE VCRL + SZ 2-0 L27IN ABSRB UD CP-1 1/2 CIR REV CUT VCP266H

## (undated) DEVICE — 4-PORT MANIFOLD: Brand: NEPTUNE 2

## (undated) DEVICE — GLOVE SURG SZ 8 L12IN FNGR THK13MIL BRN LTX SYN POLYMER W

## (undated) DEVICE — Z INACTIVE USE 2660664 SOLUTION IRRIG 3000ML 0.9% SOD CHL USP UROMATIC PLAS CONT

## (undated) DEVICE — AIRLIFE™ NASAL OXYGEN CANNULA CURVED, FLARED TIP, WITH 7 FEET (2.1 M) CRUSH RESISTANT TUBING, OVER-THE-EAR STYLE: Brand: AIRLIFE™

## (undated) DEVICE — MEDI-VAC YANKAUER SUCTION HANDLE W/BULBOUS TIP: Brand: CARDINAL HEALTH

## (undated) DEVICE — 1200CC GUARDIAN II: Brand: GUARDIAN

## (undated) DEVICE — STERILE PATIENT PROTECTIVE PAD FOR IMP® KNEE POSITIONERS & COHESIVE WRAP (10 / CASE): Brand: DE MAYO KNEE POSITIONER®

## (undated) DEVICE — Device

## (undated) DEVICE — KIT AUTOTRNS APPL AERO 2 SET SYR 2 TIP FOR PLT SEP SYS GPS

## (undated) DEVICE — TUBING, SUCTION, 3/16" X 10', STRAIGHT: Brand: MEDLINE

## (undated) DEVICE — SUTURE VCRL + SZ 2 L27IN ABSRB UD L40MM CP 1/2 CIR REV CUT VCP195H

## (undated) DEVICE — TRAY SPNL 24GA L4IN PENCAN PNCL PNT NDL 0.75% BIPIVCAIN W/

## (undated) DEVICE — MEDI-VAC NON-CONDUCTIVE SUCTION TUBING: Brand: CARDINAL HEALTH

## (undated) DEVICE — 3M™ WARMING BLANKET, UPPER BODY, 10 PER CASE, 42268: Brand: BAIR HUGGER™

## (undated) DEVICE — DUP USE 393023 JELLY LUBRICATING EZ 2OZ

## (undated) DEVICE — SURGICAL PROCEDURE PACK PLAS C

## (undated) DEVICE — COOLER THER 20-31IN L CRYO COMB W/ PD KNEE TB GRAV FLO